# Patient Record
Sex: MALE | Race: WHITE | Employment: OTHER | URBAN - METROPOLITAN AREA
[De-identification: names, ages, dates, MRNs, and addresses within clinical notes are randomized per-mention and may not be internally consistent; named-entity substitution may affect disease eponyms.]

---

## 2017-07-28 PROBLEM — N40.0 BENIGN PROSTATIC HYPERPLASIA WITHOUT LOWER URINARY TRACT SYMPTOMS: Status: ACTIVE | Noted: 2017-07-28

## 2018-11-16 PROBLEM — Z79.899 ENCOUNTER FOR LONG-TERM CURRENT USE OF MEDICATION: Status: ACTIVE | Noted: 2018-11-16

## 2019-09-24 PROBLEM — C45.0 MESOTHELIOMA (PLEURAL) (HCC): Status: ACTIVE | Noted: 2019-09-24

## 2019-10-11 ENCOUNTER — HOSPITAL ENCOUNTER (OUTPATIENT)
Dept: CV DIAGNOSTICS | Facility: HOSPITAL | Age: 74
Discharge: HOME | End: 2019-10-11
Attending: SURGERY
Payer: MEDICARE

## 2019-10-11 DIAGNOSIS — R06.02 BREATH SHORTNESS: ICD-10-CM

## 2019-10-11 PROCEDURE — 93306 TTE W/DOPPLER COMPLETE: CPT | Performed by: SURGERY

## 2020-02-20 PROBLEM — G47.00 INSOMNIA, UNSPECIFIED TYPE: Status: ACTIVE | Noted: 2020-02-20

## 2020-06-22 PROBLEM — Z79.899 ENCOUNTER FOR LONG-TERM CURRENT USE OF MEDICATION: Status: RESOLVED | Noted: 2018-11-16 | Resolved: 2020-06-22

## 2020-06-22 PROBLEM — G89.29 OTHER CHRONIC PAIN: Status: ACTIVE | Noted: 2020-06-22

## 2020-10-01 PROBLEM — G47.00 INSOMNIA, UNSPECIFIED TYPE: Status: RESOLVED | Noted: 2020-02-20 | Resolved: 2020-10-01

## 2020-10-01 PROBLEM — F41.9 ANXIETY: Status: ACTIVE | Noted: 2020-10-01

## 2021-04-23 PROBLEM — R60.0 LOWER LEG EDEMA: Status: ACTIVE | Noted: 2021-04-23

## 2021-10-11 ENCOUNTER — HOSPITAL ENCOUNTER (INPATIENT)
Facility: HOSPITAL | Age: 76
LOS: 5 days | Discharge: HOME | DRG: 864 | End: 2021-10-16
Attending: EMERGENCY MEDICINE | Admitting: INTERNAL MEDICINE
Payer: MEDICARE

## 2021-10-11 ENCOUNTER — APPOINTMENT (OUTPATIENT)
Dept: ULTRASOUND IMAGING | Facility: HOSPITAL | Age: 76
DRG: 864 | End: 2021-10-11
Attending: EMERGENCY MEDICINE
Payer: MEDICARE

## 2021-10-11 ENCOUNTER — APPOINTMENT (OUTPATIENT)
Dept: CT IMAGING | Facility: HOSPITAL | Age: 76
DRG: 864 | End: 2021-10-11
Attending: EMERGENCY MEDICINE
Payer: MEDICARE

## 2021-10-11 ENCOUNTER — APPOINTMENT (OUTPATIENT)
Dept: GENERAL RADIOLOGY | Facility: HOSPITAL | Age: 76
DRG: 864 | End: 2021-10-11
Attending: EMERGENCY MEDICINE
Payer: MEDICARE

## 2021-10-11 DIAGNOSIS — R09.02 HYPOXIA: ICD-10-CM
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
## 2021-10-11 DIAGNOSIS — J94.8 HYDROPNEUMOTHORAX: ICD-10-CM

## 2021-10-11 DIAGNOSIS — R91.8 LUNG MASS: ICD-10-CM

## 2021-10-11 DIAGNOSIS — R50.9 FEVER, UNSPECIFIED FEVER CAUSE: Primary | ICD-10-CM

## 2021-10-11 DIAGNOSIS — J18.9 NOSOCOMIAL PNEUMONIA: ICD-10-CM

## 2021-10-11 PROBLEM — R79.89 AZOTEMIA: Status: ACTIVE | Noted: 2021-10-11

## 2021-10-11 PROBLEM — E87.1 HYPONATREMIA: Status: ACTIVE | Noted: 2021-10-11

## 2021-10-11 PROBLEM — D69.6 THROMBOCYTOPENIA (HCC): Status: ACTIVE | Noted: 2021-10-11

## 2021-10-11 PROBLEM — R73.9 HYPERGLYCEMIA: Status: ACTIVE | Noted: 2021-10-11

## 2021-10-11 PROCEDURE — 96366 THER/PROPH/DIAG IV INF ADDON: CPT

## 2021-10-11 PROCEDURE — 71045 X-RAY EXAM CHEST 1 VIEW: CPT | Performed by: EMERGENCY MEDICINE

## 2021-10-11 PROCEDURE — 84295 ASSAY OF SERUM SODIUM: CPT | Performed by: INTERNAL MEDICINE

## 2021-10-11 PROCEDURE — 93005 ELECTROCARDIOGRAM TRACING: CPT

## 2021-10-11 PROCEDURE — 94660 CPAP INITIATION&MGMT: CPT

## 2021-10-11 PROCEDURE — 85025 COMPLETE CBC W/AUTO DIFF WBC: CPT | Performed by: EMERGENCY MEDICINE

## 2021-10-11 PROCEDURE — 36415 COLL VENOUS BLD VENIPUNCTURE: CPT

## 2021-10-11 PROCEDURE — 87040 BLOOD CULTURE FOR BACTERIA: CPT | Performed by: EMERGENCY MEDICINE

## 2021-10-11 PROCEDURE — 93971 EXTREMITY STUDY: CPT | Performed by: EMERGENCY MEDICINE

## 2021-10-11 PROCEDURE — 99285 EMERGENCY DEPT VISIT HI MDM: CPT

## 2021-10-11 PROCEDURE — 96361 HYDRATE IV INFUSION ADD-ON: CPT

## 2021-10-11 PROCEDURE — 80053 COMPREHEN METABOLIC PANEL: CPT | Performed by: EMERGENCY MEDICINE

## 2021-10-11 PROCEDURE — 81001 URINALYSIS AUTO W/SCOPE: CPT | Performed by: EMERGENCY MEDICINE

## 2021-10-11 PROCEDURE — 83605 ASSAY OF LACTIC ACID: CPT | Performed by: EMERGENCY MEDICINE

## 2021-10-11 PROCEDURE — 71275 CT ANGIOGRAPHY CHEST: CPT | Performed by: EMERGENCY MEDICINE

## 2021-10-11 PROCEDURE — 5A09357 ASSISTANCE WITH RESPIRATORY VENTILATION, LESS THAN 24 CONSECUTIVE HOURS, CONTINUOUS POSITIVE AIRWAY PRESSURE: ICD-10-PCS | Performed by: INTERNAL MEDICINE

## 2021-10-11 PROCEDURE — 96365 THER/PROPH/DIAG IV INF INIT: CPT

## 2021-10-11 PROCEDURE — 84145 PROCALCITONIN (PCT): CPT | Performed by: INTERNAL MEDICINE

## 2021-10-11 PROCEDURE — 96367 TX/PROPH/DG ADDL SEQ IV INF: CPT

## 2021-10-11 PROCEDURE — 96375 TX/PRO/DX INJ NEW DRUG ADDON: CPT

## 2021-10-11 RX ORDER — ALBUTEROL SULFATE 90 UG/1
2 AEROSOL, METERED RESPIRATORY (INHALATION) EVERY 4 HOURS PRN
Status: DISCONTINUED | OUTPATIENT
Start: 2021-10-11 | End: 2021-10-16

## 2021-10-11 RX ORDER — MORPHINE SULFATE 15 MG/1
30 TABLET, FILM COATED, EXTENDED RELEASE ORAL EVERY MORNING
COMMUNITY
End: 2021-10-16

## 2021-10-11 RX ORDER — MORPHINE SULFATE 15 MG/1
15 TABLET, FILM COATED, EXTENDED RELEASE ORAL EVERY 8 HOURS SCHEDULED
COMMUNITY
End: 2021-10-16

## 2021-10-11 RX ORDER — LATANOPROST 50 UG/ML
1 SOLUTION/ DROPS OPHTHALMIC NIGHTLY
Status: DISCONTINUED | OUTPATIENT
Start: 2021-10-11 | End: 2021-10-16

## 2021-10-11 RX ORDER — LORAZEPAM 1 MG/1
2 TABLET ORAL NIGHTLY PRN
Status: DISCONTINUED | OUTPATIENT
Start: 2021-10-11 | End: 2021-10-16

## 2021-10-11 RX ORDER — SODIUM CHLORIDE 9 MG/ML
INJECTION, SOLUTION INTRAVENOUS CONTINUOUS
Status: DISCONTINUED | OUTPATIENT
Start: 2021-10-11 | End: 2021-10-12

## 2021-10-11 RX ORDER — TAMSULOSIN HYDROCHLORIDE 0.4 MG/1
0.4 CAPSULE ORAL
COMMUNITY

## 2021-10-11 RX ORDER — PREDNISONE 10 MG/1
20 TABLET ORAL 2 TIMES DAILY
COMMUNITY

## 2021-10-11 RX ORDER — DORZOLAMIDE HCL 20 MG/ML
1 SOLUTION/ DROPS OPHTHALMIC 2 TIMES DAILY
Status: DISCONTINUED | OUTPATIENT
Start: 2021-10-12 | End: 2021-10-16

## 2021-10-11 RX ORDER — FLUTICASONE FUROATE AND VILANTEROL TRIFENATATE 100; 25 UG/1; UG/1
1 POWDER RESPIRATORY (INHALATION) DAILY
COMMUNITY

## 2021-10-11 RX ORDER — ENOXAPARIN SODIUM 100 MG/ML
40 INJECTION SUBCUTANEOUS DAILY
Status: DISCONTINUED | OUTPATIENT
Start: 2021-10-11 | End: 2021-10-16

## 2021-10-11 RX ORDER — ATORVASTATIN CALCIUM 40 MG/1
40 TABLET, FILM COATED ORAL NIGHTLY
Status: DISCONTINUED | OUTPATIENT
Start: 2021-10-11 | End: 2021-10-16

## 2021-10-11 RX ORDER — MORPHINE SULFATE 2 MG/ML
INJECTION, SOLUTION INTRAMUSCULAR; INTRAVENOUS
Status: COMPLETED
Start: 2021-10-11 | End: 2021-10-11

## 2021-10-11 RX ORDER — ACETAMINOPHEN 500 MG
1000 TABLET ORAL ONCE
Status: COMPLETED | OUTPATIENT
Start: 2021-10-11 | End: 2021-10-11

## 2021-10-11 RX ORDER — MORPHINE SULFATE 30 MG/1
30 TABLET, FILM COATED, EXTENDED RELEASE ORAL 3 TIMES DAILY
Status: DISCONTINUED | OUTPATIENT
Start: 2021-10-11 | End: 2021-10-16

## 2021-10-11 RX ORDER — ONDANSETRON 2 MG/ML
4 INJECTION INTRAMUSCULAR; INTRAVENOUS EVERY 6 HOURS PRN
Status: DISCONTINUED | OUTPATIENT
Start: 2021-10-11 | End: 2021-10-16

## 2021-10-11 RX ORDER — HYDROCODONE BITARTRATE AND ACETAMINOPHEN 10; 325 MG/1; MG/1
1 TABLET ORAL EVERY 4 HOURS PRN
Status: DISCONTINUED | OUTPATIENT
Start: 2021-10-11 | End: 2021-10-16

## 2021-10-11 RX ORDER — FUROSEMIDE 20 MG/1
20 TABLET ORAL DAILY
COMMUNITY
End: 2021-10-16

## 2021-10-11 RX ORDER — MORPHINE SULFATE 2 MG/ML
2 INJECTION, SOLUTION INTRAMUSCULAR; INTRAVENOUS ONCE
Status: DISCONTINUED | OUTPATIENT
Start: 2021-10-11 | End: 2021-10-16

## 2021-10-11 RX ORDER — ONDANSETRON 2 MG/ML
4 INJECTION INTRAMUSCULAR; INTRAVENOUS ONCE
Status: COMPLETED | OUTPATIENT
Start: 2021-10-11 | End: 2021-10-11

## 2021-10-11 RX ORDER — ONDANSETRON 2 MG/ML
4 INJECTION INTRAMUSCULAR; INTRAVENOUS EVERY 4 HOURS PRN
Status: DISCONTINUED | OUTPATIENT
Start: 2021-10-11 | End: 2021-10-11

## 2021-10-11 RX ORDER — MORPHINE SULFATE 2 MG/ML
2 INJECTION, SOLUTION INTRAMUSCULAR; INTRAVENOUS ONCE
Status: COMPLETED | OUTPATIENT
Start: 2021-10-11 | End: 2021-10-11

## 2021-10-11 RX ORDER — MORPHINE SULFATE 30 MG/1
1 TABLET, FILM COATED, EXTENDED RELEASE ORAL EVERY 8 HOURS
COMMUNITY
Start: 2021-10-10 | End: 2021-10-11

## 2021-10-11 RX ORDER — METOCLOPRAMIDE HYDROCHLORIDE 5 MG/ML
10 INJECTION INTRAMUSCULAR; INTRAVENOUS EVERY 8 HOURS PRN
Status: DISCONTINUED | OUTPATIENT
Start: 2021-10-11 | End: 2021-10-16

## 2021-10-11 RX ORDER — MORPHINE SULFATE 15 MG/1
30 TABLET, FILM COATED, EXTENDED RELEASE ORAL NIGHTLY
COMMUNITY
End: 2021-10-16

## 2021-10-11 RX ORDER — DORZOLAMIDE HCL 20 MG/ML
1 SOLUTION/ DROPS OPHTHALMIC 2 TIMES DAILY
COMMUNITY

## 2021-10-11 RX ORDER — DIPHENHYDRAMINE HYDROCHLORIDE 50 MG/ML
25 INJECTION INTRAMUSCULAR; INTRAVENOUS ONCE
Status: COMPLETED | OUTPATIENT
Start: 2021-10-11 | End: 2021-10-11

## 2021-10-11 RX ORDER — VANCOMYCIN/0.9 % SOD CHLORIDE 1.75 G/5
25 PLASTIC BAG, INJECTION (ML) INTRAVENOUS ONCE
Status: COMPLETED | OUTPATIENT
Start: 2021-10-11 | End: 2021-10-11

## 2021-10-11 RX ORDER — TAMSULOSIN HYDROCHLORIDE 0.4 MG/1
0.4 CAPSULE ORAL
Status: DISCONTINUED | OUTPATIENT
Start: 2021-10-11 | End: 2021-10-16

## 2021-10-11 RX ORDER — MELATONIN
3 NIGHTLY
Status: DISCONTINUED | OUTPATIENT
Start: 2021-10-11 | End: 2021-10-16

## 2021-10-11 RX ORDER — SODIUM CHLORIDE 9 MG/ML
INJECTION, SOLUTION INTRAVENOUS CONTINUOUS
Status: ACTIVE | OUTPATIENT
Start: 2021-10-11 | End: 2021-10-12

## 2021-10-11 NOTE — H&P
[facility] Hospitalist H&P       CC: Patient presents with:  Fever       PCP: Cherie Gaming MD    History of Present Illness:  Ms. Charmian Hodgkin is a 63 yo male with PMH of mesothelioma s/p right thoracotomy for pleurectomy (10/16/19) and currently on immunotherapy, mg total) by mouth daily. , Disp: 90 tablet, Rfl: 3  LORazepam 1 MG Oral Tab, Take 1 mg by mouth., Disp: , Rfl:   HYDROcodone-acetaminophen  MG Oral Tab, Take 1 tablet by mouth nightly as needed. , Disp: , Rfl:   Albuterol Sulfate  (90 Base) MCG rashes/lesions  Psych: normal mood/affect        Diagnostic Data:    CBC/Chem  Recent Labs   Lab 10/05/21  1036 10/11/21  1243 10/11/21  1347   WBC 9.77 5.43 5.8   HGB 13.6 13.7 14.3   MCV 93.2 88.0 87.3    132* 128.0*       Recent Labs   Lab 10/05/ right lower lung could represent a combination of effusion and atelectasis or pneumonia although some of this is probably postoperative. 3. Blunting left costophrenic angle consistent with small left pleural effusion.     Dictated by (CST): Isabella Castellano MD

## 2021-10-11 NOTE — ED PROVIDER NOTES
Patient Seen in: [facility] Emergency Department      History   Patient presents with:  Fever    Stated Complaint: Pt from [facility], diarrhea x4 with 1 epidose of vomitin today, +fever,     Subjective:   HPI    This is 66-year-old male who has mesothelio 2 Standard drinks or equivalent per week      Comment: rare    Drug use: No             Review of Systems    Positive for stated complaint: Pt from [facility], diarrhea x4 with 1 epidose of vomitin today, +fever,   Other systems are as noted in HPI.   Constitution other components within normal limits   COMP METABOLIC PANEL (14) - Abnormal; Notable for the following components:    Glucose 112 (*)     Sodium 127 (*)     Chloride 94 (*)     Calculated Osmolality 266 (*)     ALT 14 (*)     Total Protein 6.0 (*)     Alb partial resection right 7th and 8th ribs. Correlation with known surgical history is necessary. There is blunting left costophrenic angle which is most likely a small left effusion as well. Heart size is within normal limits.   Mediastinum and joe are u right thyroid nodule. Further evaluation with dedicated thyroid ultrasound is recommended per ACR guidelines. 9. Stable mild aneurysmal dilatation of the ascending thoracic aorta, measuring up to 4.2 cm. 10. Severe coronary artery calcifications.    11. of breath and fevers. CONTRAST USED:  100cc of Omnipaque 350  FINDINGS:  VASCULATURE:  There is no evidence for pulmonary embolism.   There is mild right-sided pulmonary arterial narrowing involving right upper lobe, right middle lobe and right lower lobe image 241 of series 5. There is a band of airspace disease seen along the posterior medial aspect of the left lower lobe which may reflect atelectasis or pneumonia. This is adjacent to a left pleural effusion.   MEDIASTINUM:  The esophagus appears mildly d embolism. 2. There is a loculated right hydropneumothorax which is predominantly gas-filled. 3. Irregular pleural thickening throughout the right lung extending into the mediastinum and hilum mildly narrowing the central pulmonary arteries and veins.   Ther 10/11/2021  PROCEDURE:  XR CHEST AP PORTABLE  (CPT=71045)  TECHNIQUE:  AP chest radiograph was obtained. COMPARISON:  None.   INDICATIONS:  Pt from [facility], diarrhea x4 with 1 epidose of vomitin today, +fever,  PATIENT STATED HISTORY: (As transcribed by Steffi Hagan and felt because of his because the weight is located and the fact that he would benefit from actually a CT-guided IR procedure. I did talk to the [facility] hospitalist they will order in the morning.  I did go back and see the patient after the patient had gotte

## 2021-10-12 PROCEDURE — 80053 COMPREHEN METABOLIC PANEL: CPT | Performed by: INTERNAL MEDICINE

## 2021-10-12 PROCEDURE — 94640 AIRWAY INHALATION TREATMENT: CPT

## 2021-10-12 PROCEDURE — 85025 COMPLETE CBC W/AUTO DIFF WBC: CPT | Performed by: INTERNAL MEDICINE

## 2021-10-12 PROCEDURE — 85610 PROTHROMBIN TIME: CPT | Performed by: INTERNAL MEDICINE

## 2021-10-12 NOTE — CONSULTS
[facility]  Report of Consultation    Stephanie Savage Patient Status:  Inpatient    10/11/1945 MRN [id number]   [facility] 4NW-A Attending Xena Davis MD   Hosp Day # 1 PCP Tobi Turcios MD     ADMIT DATE AND TIME: 10/11/202 Brother        SOCIAL HX   reports that he quit smoking about 55 years ago. His smoking use included cigarettes. He has a 3.00 pack-year smoking history.  He has never used smokeless tobacco. He reports current alcohol use of about 1.0 - 2.0 standard drink negatives per the HPI. Physical Exam:   Blood pressure 108/64, pulse 91, temperature 98.1 °F (36.7 °C), temperature source Oral, resp. rate 16, height 1.702 m (5' 7\"), weight 70.8 kg (156 lb), SpO2 95 %.     Performance Status: 2   General: Patient i Collection Time: 10/11/21 12:43 PM   Result Value Ref Range    Adjusted WBC 5.43 4.00 - 13.00 10^3/uL    Neutrophils Absolute Manual 4.34 1.30 - 6.70 10ˆ3/µL    Lymphocytes Absolute Manual 0.33 (L) 0.90 - 4.00 10ˆ3/µL    Monocytes Absolute Manual 0.76 0.10 Result Value Ref Range    Procalcitonin 0.14 <=0.16 ng/mL   RAINBOW DRAW LAVENDER    Collection Time: 10/11/21  1:47 PM   Result Value Ref Range    Hold Lavender Auto Resulted    CBC W/ DIFFERENTIAL    Collection Time: 10/11/21  1:47 PM   Result Value Re 3-5 (A) 0 - 2 /HPF    Bacteria Urine None Seen None Seen /HPF    Squamous Epi.  Cells Few (A) None Seen /HPF    Renal Tubular Epithelial Cells None Seen None Seen /HPF    Transitional Cells None Seen None Seen /HPF    Ca Oxalate Crystals Few (A) None Seen / 10/11/2021  CONCLUSION:  Unremarkable left upper extremity Doppler ultrasound examination. There is no evidence of DVT.     Dictated by (CST): Rita Barton MD on 10/11/2021 at 6:33 PM     Finalized by (CST): Rita Barton MD on 10/11/2021 at 6:34 PM immunotherapy and can have systemic inflammation with fever. Cultures drawn and on empiric antibiotics. May need Thoracentesis and chest tube     Reviewed clinical condition with him  No active cancer therapy while here.  He understand that if there is

## 2021-10-12 NOTE — CONSULTS
Pulmonary H&P/Consult       NAME: Nikolai Bassem Dr: [id number] - MRN: [id number] - Age: 64year old - :  10/11/1945    Date of Admission: 10/11/2021  1:26 PM  Admission Diagnosis: Lung mass [R91.8]  Hypoxia [R09.02]  Nosocomial pneumonia [J18.9, Y9 mouth After dinner. Evening, Disp: , Rfl: , 10/10/2021 at 2200  Dorzolamide HCl 2 % Ophthalmic Solution, Place 1 drop into both eyes 2 (two) times a day., Disp: , Rfl: , 10/11/2021 at 2100  furosemide 20 MG Oral Tab, Take 20 mg by mouth daily. , Disp: , Rfl education: Not on file      Highest education level: Not on file    Occupational History      Not on file    Tobacco Use      Smoking status: Former Smoker        Packs/day: 1.00        Years: 3.00        Pack years: 3        Types: Cigarettes        Quit on file      Marital Status: Not on file  Intimate Partner Violence:       Fear of Current or Ex-Partner: Not on file      Emotionally Abused: Not on file      Physically Abused: Not on file      Sexually Abused: Not on file  Housing Stability:       Unabl Disp: , Rfl:   latanoprost (XALATAN) 0.005 % Ophthalmic Solution, Place 1 drop into both eyes nightly., Disp: , Rfl:         Scheduled Medication:  • magnesium sulfate  4 g Intravenous Once   • morphINE sulfate  2 mg Intravenous Once   • atorvastatin  40 m Eyes:    PERRL, conjunctiva/corneas clear, EOM's intact, both eyes   Throat:   Lips, mucosa, and tongue normal; teeth and gums normal   Neck:   Supple, symmetrical, trachea midline, no adenopathy;        thyroid:  No enlargement/tenderness/nodules; no ca

## 2021-10-12 NOTE — CONSULTS
INFECTIOUS DISEASE [facility] Patient Status:  Inpatient    10/11/1945 MRN [id number]   [facility] 4NW-A Attending Guille Lundberg MD   [facility] Day # 1 PCP Ning Perdomo history. He has never used smokeless tobacco. He reports current alcohol use of about 1.0 - 2.0 standard drink of alcohol per week. He reports that he does not use drugs.       Allergies:    Sulfa Antibiotics       HIVES  Adhesive Tape             Merthiola Dorzolamide HCl 2 % Ophthalmic Solution, Place 1 drop into both eyes 2 (two) times a day., Disp: , Rfl:   furosemide 20 MG Oral Tab, Take 20 mg by mouth daily. , Disp: , Rfl:   morphINE Sulfate ER 15 MG Oral Tab CR, Take 30 mg by mouth every morning., Dis nondistended. Musculoskeletal: Full range of motion of all extremities. No swelling noted. Joints: no effusions  Skin: No lesions.  No erythema, no open wounds      Laboratory Data:  Laboratory data reviewed      Recent Labs   Lab 10/12/21  0643   RBC mass     Hypoxia      ASSESSMENT/PLAN:  1.  Mesothelioma  On chemo at [facility]  -admitted 10/11 with reports of diarrhea at home and low grade temps  Has not had diarrhea since admission    CT shows loculated hydropneumothorax, pulmonary following and scheduled

## 2021-10-12 NOTE — DIETARY NOTE
[facility]  NUTRITION ASSESSMENT    Pt does not meet malnutrition criteria. NUTRITION INTERVENTION:  1. Meal and Snacks - Monitor patient po intake. Encourage adequate po of appropriate diet. 2. Medical Food Supplements - RD added Ensure HP BID.  R SYSTEM REVIEW: emesis and nausea PTA    NUTRITION RELATED PHYSICAL FINDINGS:     1. Body Fat/Muscle Mass: well nourished      2.  Fluid Accumulation: upper extremity edema and lower extremity edema     NUTRITION PRESCRIPTION: 70.8 kg  Calories: [id number] ca

## 2021-10-12 NOTE — PROGRESS NOTES
Pt AxOx4. Scheduled medications given. Wife help[ed answer navigator questions at bedside. PRN ativan given. Resting comfortably. CPAP in place.

## 2021-10-13 PROCEDURE — 87493 C DIFF AMPLIFIED PROBE: CPT | Performed by: HOSPITALIST

## 2021-10-13 PROCEDURE — 83735 ASSAY OF MAGNESIUM: CPT | Performed by: INTERNAL MEDICINE

## 2021-10-13 RX ORDER — LOPERAMIDE HYDROCHLORIDE 2 MG/1
2 CAPSULE ORAL 4 TIMES DAILY PRN
Status: DISCONTINUED | OUTPATIENT
Start: 2021-10-13 | End: 2021-10-16

## 2021-10-13 NOTE — PLAN OF CARE
Problem: CARDIOVASCULAR - ADULT  Goal: Maintains optimal cardiac output and hemodynamic stability  Description: INTERVENTIONS:  - Monitor vital signs, rhythm, and trends  -  Problem: GASTROINTESTINAL - ADULT  Goal: Maintains adequate nutritional intake (

## 2021-10-13 NOTE — PLAN OF CARE
Pt alert x4 c/o L arm swelling dopplers negative for dvt. Pt arm elevated on pillow. Pt has chronic pain on ms contin. Pt has Port a cath to L chest ER attempted to access port without success.  Port re accessed this am with 1in power needle unable to flush

## 2021-10-13 NOTE — PROGRESS NOTES
[facility]  Progress Note    Andres Butler Patient Status:  Inpatient    10/11/1945 MRN [id number]   [facility] 4NW-A Attending Raul Zuñiga MD   Hosp Day # 2 PCP Clifford Colorado MD     ADMISSION DATE AND TIME: 10/11/2021  1: 3 mg Oral Nightly   • morphINE Sulfate ER  30 mg Oral TID   • predniSONE  30 mg Oral Daily   • tamsulosin  0.4 mg Oral After dinner   • enoxaparin  40 mg Subcutaneous Daily         ASSESSMENT AND PLAN:     Jinny Hartley is a 64year old male with metast

## 2021-10-13 NOTE — PROGRESS NOTES
[facility] Hospitalist Progress Note     SUBJECTIVE:  3 watery BMs today. Pt concerned this will continue. Minimal PO intake.      OBJECTIVE:  Temp:  [97.8 °F (36.6 °C)-98.3 °F (36.8 °C)] 98.2 °F (36.8 °C)  Pulse:  [82-98] 91  Resp:  [16-18] 18  BP: (106-127)/(5 puff, 1 puff, Inhalation, Daily  HYDROcodone-acetaminophen (NORCO)  MG per tab 1 tablet, 1 tablet, Oral, Q4H PRN  latanoprost (XALATAN) 0.005 % ophthalmic solution 1 drop, 1 drop, Both Eyes, Nightly  LORazepam (ATIVAN) tab 2 mg, 2 mg, Oral, Nightly P Emily Castaneda [facility]  Internal Medicine, Hospitalist  Answering service: [phone]

## 2021-10-13 NOTE — PLAN OF CARE
Patient A+Ox4, forgetful at times. C/o mild pain but declined any pain medication besides scheduled morphine. Fall precautions maintained. Had a few episodes of diarrhea. Sent specimen to lab for cdiff, negation. Isolation discontinued.  Updated MD on diarr assistive devices as appropriate  - Consider OT/PT consult to assist with strengthening/mobility  - Encourage toileting schedule  Outcome: Progressing     Problem: GASTROINTESTINAL - ADULT  Goal: Maintains or returns to baseline bowel function  Description

## 2021-10-13 NOTE — PROGRESS NOTES
[facility]                INFECTIOUS DISEASE PROGRESS NOTE    Tanya Grace Patient Status:  Inpatient    10/11/1945 MRN [id number]   [facility] 4NW-A Attending Marthenia Goodell, MD   Hosp Day # 2 PCP Swati Tiwari MD     A --  20   ALT 11 14*  --  20   BILT 0.70 0.8  --  0.8   TP 5.8* 6.0*  --  5.1*       No results found for: [facility] Encounter on 10/11/21   1.  Blood Culture     Status: None (Preliminary result)    Collection Time: 10/11/21  3:05 PM

## 2021-10-14 ENCOUNTER — APPOINTMENT (OUTPATIENT)
Dept: CT IMAGING | Facility: HOSPITAL | Age: 76
DRG: 864 | End: 2021-10-14
Attending: HOSPITALIST
Payer: MEDICARE

## 2021-10-14 PROCEDURE — 84132 ASSAY OF SERUM POTASSIUM: CPT | Performed by: HOSPITALIST

## 2021-10-14 PROCEDURE — 80048 BASIC METABOLIC PNL TOTAL CA: CPT | Performed by: HOSPITALIST

## 2021-10-14 PROCEDURE — 74176 CT ABD & PELVIS W/O CONTRAST: CPT | Performed by: HOSPITALIST

## 2021-10-14 RX ORDER — POTASSIUM CHLORIDE 20 MEQ/1
40 TABLET, EXTENDED RELEASE ORAL EVERY 4 HOURS
Status: COMPLETED | OUTPATIENT
Start: 2021-10-14 | End: 2021-10-14

## 2021-10-14 NOTE — PLAN OF CARE
Patient alert and oriented x 4, lung sounds diminished on room air, SOB with exertion, CPOX, abdomen soft, bowel sounds present, passing flatus, voids adequate amounts, loose stools this evening with associated cramping, imodium admin as ordered, medicatio

## 2021-10-14 NOTE — PROGRESS NOTES
[facility] Hospitalist Progress Note     SUBJECTIVE:  Multiple watery BMs since yesterday. +nausea. Small emesis reported. Having some vague LLQ discomfort. Supportive family at bedside.      OBJECTIVE:  Temp:  [98.3 °F (36.8 °C)-98.6 °F (37 °C)] 98.3 °F (36 solution 1 drop, 1 drop, Both Eyes, BID  fluticasone furoate-vilanterol (BREO ELLIPTA) 100-25 MCG/INH inhaler 1 puff, 1 puff, Inhalation, Daily  HYDROcodone-acetaminophen (NORCO)  MG per tab 1 tablet, 1 tablet, Oral, Q4H PRN  latanoprost (XALATAN) 0. follows with oncology at [facility]  - onc c/s -- appreciate recs  - has been on immunotherapy     # Chronic pain  - takes PO morphine 30 mg TID  - norco prn  - continue     Prophy:  DVT: lovenox  Deconditioning prevention: PT/OT     Dispo: dc when diarrhea and

## 2021-10-14 NOTE — PROGRESS NOTES
[facility]                INFECTIOUS DISEASE PROGRESS NOTE    Thierno Bo Patient Status:  Inpatient    10/11/1945 MRN [id number]   [facility] 4NW-A Attending Talha Aceves MD   Hosp Day # 3 PCP Sebastien Cedillo MD 132* 134*   K 4.00 3.6  --   --  3.6 3.0*   CL 91* 94*  --   --  100 104   CO2 28.2 28.0  --   --  25.0 25.0   ALKPHO 83 65  --   --  64  --    AST 19 19  --   --  20  --    ALT 11 14*  --   --  20  --    BILT 0.70 0.8  --   --  0.8  --    TP 5.8* 6.0*  --

## 2021-10-15 ENCOUNTER — APPOINTMENT (OUTPATIENT)
Dept: ULTRASOUND IMAGING | Facility: HOSPITAL | Age: 76
DRG: 864 | End: 2021-10-15
Attending: HOSPITALIST
Payer: MEDICARE

## 2021-10-15 PROCEDURE — 81001 URINALYSIS AUTO W/SCOPE: CPT | Performed by: INTERNAL MEDICINE

## 2021-10-15 PROCEDURE — 76700 US EXAM ABDOM COMPLETE: CPT | Performed by: HOSPITALIST

## 2021-10-15 RX ORDER — HYDROCORTISONE 25 MG/G
CREAM TOPICAL 2 TIMES DAILY
Status: DISCONTINUED | OUTPATIENT
Start: 2021-10-15 | End: 2021-10-16

## 2021-10-15 NOTE — PROGRESS NOTES
[facility]                INFECTIOUS DISEASE PROGRESS NOTE    Erasto Harvey Patient Status:  Inpatient    10/11/1945 MRN [id number]   [facility] 4NW-A Attending Radha Bennett MD   Hosp Day # 4 PCP Anthony Booker MD 127*   < > 128* 132* 134*  --    K 4.00 3.6   < >  --  3.6 3.0* 3.9   CL 91* 94*  --   --  100 104  --    CO2 28.2 28.0  --   --  25.0 25.0  --    ALKPHO 83 65  --   --  64  --   --    AST 19 19  --   --  20  --   --    ALT 11 14*  --   --  20  --   --

## 2021-10-15 NOTE — CONSULTS
[facility]                                             Department of Urology  Consultation Note    Tanya Grace Patient Status:  Inpatient    10/11/1945 MRN [id number]   Location E Laterality Date   • CATARACT     • COLONOSCOPY     • COLONOSCOPY N/A 10/6/2017    Procedure: COLONOSCOPY, POSSIBLE BIOPSY, POSSIBLE POLYPECTOMY 19753;  Surgeon:  Jenelle Eugene MD;  Location: [address] [place] [address]   • OTHER SURGICAL HISTORY      trig (FLOMAX) cap 0.4 mg, 0.4 mg, Oral, After dinner  •  enoxaparin (LOVENOX) 40 MG/0.4ML injection 40 mg, 40 mg, Subcutaneous, Daily  •  ondansetron (ZOFRAN) injection 4 mg, 4 mg, Intravenous, Q6H PRN  •  metoclopramide (REGLAN) injection 10 mg, 10 mg, [place] is seen.  Punctate possible proteinaceous cyst at the mid medullary region of the right kidney.  This is incompletely   assessed without intravenous contrast.   AORTA/VASCULAR:  Scattered atherosclerosis. RETROPERITONEUM:  Unremarkable.    BOWEL/MESENTERY yesterday  Fevers prior to admission  Tmax 100.4 on 10/11/21, afebrile 10/12, 10/13,. 10/14, and so far today  UA does not appear infectiou on 10/11/21, repeat UA has been ordered  2/2/ blood cultures 10/11/21: prelim no growth after 3 days  Lactic acid 10

## 2021-10-15 NOTE — DIETARY NOTE
[facility]  NUTRITION ASSESSMENT    Pt does not meet malnutrition criteria. NUTRITION INTERVENTION:  1. Meal and Snacks - Monitor patient po intake. Encourage adequate po of appropriate diet. 2. Medical Food Supplements - RD added Ensure HP BID.  R (%)    10/12/21 2000 50 %    10/13/21 0800 0 %    10/13/21 1301 100 %    10/13/21 1813 100 %    10/14/21 0900 10 %      FOOD/NUTRITION RELATED HISTORY:   Appetite: Fair  Intake: See above  Intake Meeting Needs: Marginal, added supplements  Food Allergies:

## 2021-10-15 NOTE — PLAN OF CARE
Alert and oriented x4. Dyspnea on exertion. Room air with cpap at night. Tele NSR. Prn norco given for kacey flank pain. NPO for urology eval. Strain all urine, no visible stones noted. Had x4 diarrhea Prn imodium given. Encouraged PO intake.  US abdomen to

## 2021-10-15 NOTE — PROGRESS NOTES
[facility] Hospitalist Progress Note     SUBJECTIVE:  Still with diarrhea, decreasing in quantity. LLQ/flank pain remains.      OBJECTIVE:  Temp:  [97.4 °F (36.3 °C)-98.2 °F (36.8 °C)] 97.6 °F (36.4 °C)  Pulse:  [74-94] 94  Resp:  [18-20] 20  BP: (113-138)/(61-8 100-25 MCG/INH inhaler 1 puff, 1 puff, Inhalation, Daily  HYDROcodone-acetaminophen (NORCO)  MG per tab 1 tablet, 1 tablet, Oral, Q4H PRN  latanoprost (XALATAN) 0.005 % ophthalmic solution 1 drop, 1 drop, Both Eyes, Nightly  LORazepam (ATIVAN) tab 2 # gallstones and ?gallbladder distention  - incidentally noted on CT a/p  - f/u abd ultrasound: no gallbladder wall thickening, neg murphys, no biliary ductal dilation, no surrounding fluid.  No abd pain on exam.   - no further w/u indicated at this jf

## 2021-10-16 VITALS
BODY MASS INDEX: 25.08 KG/M2 | RESPIRATION RATE: 18 BRPM | TEMPERATURE: 98 F | SYSTOLIC BLOOD PRESSURE: 122 MMHG | OXYGEN SATURATION: 98 % | HEART RATE: 90 BPM | WEIGHT: 159.81 LBS | DIASTOLIC BLOOD PRESSURE: 76 MMHG | HEIGHT: 67 IN

## 2021-10-16 PROCEDURE — 85025 COMPLETE CBC W/AUTO DIFF WBC: CPT | Performed by: HOSPITALIST

## 2021-10-16 PROCEDURE — 80048 BASIC METABOLIC PNL TOTAL CA: CPT | Performed by: HOSPITALIST

## 2021-10-16 RX ORDER — HYDROCORTISONE 25 MG/G
1 CREAM TOPICAL 2 TIMES DAILY
Qty: 28 G | Refills: 0 | Status: SHIPPED | OUTPATIENT
Start: 2021-10-16

## 2021-10-16 RX ORDER — MORPHINE SULFATE 30 MG/1
30 TABLET, FILM COATED, EXTENDED RELEASE ORAL 3 TIMES DAILY
Qty: 1 TABLET | Refills: 0 | Status: SHIPPED | COMMUNITY
Start: 2021-10-16

## 2021-10-16 RX ORDER — POTASSIUM CHLORIDE 20 MEQ/1
40 TABLET, EXTENDED RELEASE ORAL ONCE
Status: COMPLETED | OUTPATIENT
Start: 2021-10-16 | End: 2021-10-16

## 2021-10-16 NOTE — PLAN OF CARE
Patient c/o mild pain on right chest. Respirations non-labored, lungs sound diminished, O2 sat 98% on room air. Patient lower back pain, denies pain with urination, urine strained, no stone noted. Patient had 1 loose stool this morning.  Patient's vital sig

## 2021-10-16 NOTE — PROGRESS NOTES
[facility]    Progress Note    Joanna Oseguera Patient Status:  Inpatient    10/11/1945 MRN [id number]   [facility] 4NW-A Attending Maninder Westbrook MD   [address] Day # 5 PCP Tina Christensen MD     Subjective:   Joanna Oseguera is a(n) 64 this ultrasound exam would be  consistent with that diagnosis.     Dictated by (CST): Ian Brice MD on 10/15/2021 at 12:54 PM     Finalized by (CST): Ian Brice MD on 10/15/2021 at 12:58 PM       CT ABDOMEN+PELVIS(CPT=74176)    Result Date: 10/14/

## 2021-10-16 NOTE — DISCHARGE SUMMARY
General Medicine Discharge Summary     Patient ID:  Evelia Hinojosa  64year old  10/11/1945    Admit date: 10/11/2021    Discharge date and time: 10/16/21     Attending Physician: Giana Serrato MD     Primary Care Physician: Divina Hooker MD     [facility] outpatient.  Diarrhea improving, imodium prn.     # L UVJ stone  - mild-mod hydronephrosis  - repeat UA negative  - cefazolin per ID  - apprec urology recs - monitor -- patient to monitor and pass at home -- f/u with urology as outpatient     # gallstones a Inhalation Aerosol Powder, Breath Activated  Inhale 1 puff into the lungs daily. Melatonin 3 MG Oral Cap  Take 3 mg by mouth at bedtime. atorvastatin 40 MG Oral Tab  Take 1 tablet (40 mg total) by mouth daily.     LORazepam 1 MG Oral Tab  Take 2 mg by

## 2021-10-16 NOTE — PROGRESS NOTES
[facility]                INFECTIOUS DISEASE PROGRESS NOTE    Jacques Davis Patient Status:  Inpatient    10/11/1945 MRN [id number]   [facility] 4NW-A Attending Zaid Leroy MD   Hosp Day # 5 PCP Andree Cano MD 8.2*  --  7.9*  --  8.3*   ALB  --  3.1* 2.4*  --  1.8*  --   --   --   --    NA  --  127* 127*   < > 132*  --  134*  --  133*   K   < > 4.00 3.6  --  3.6   < > 3.0* 3.9 3.4*   CL   < > 91* 94*  --  100  --  104  --  104   CO2   < > 28.2 28.0  --  25.0  -

## 2021-10-16 NOTE — PLAN OF CARE
Patient alert and oriented x 4, lungs diminished on room air, CPAP at HS,abdomen soft, bowel sounds present, denies nausea, BM this am, remains loose, voiding adequate amounts, asymptomatic, straining urine, scheduled narcotics for pain, continuous telemet

## 2021-10-16 NOTE — PLAN OF CARE
Pt alert/oriented x 4. Had 3-4 watery stools on day shift. Immodium given as ordered. Seen by urology, Bhupinder STEWARD today. Continue to strain urine. Voiding in adequate amounts. Urine culture sent, IV cefazolin given as ordered.   Abdominal ultrasound Follow urinary retention protocol/standard of care  - Consider collaborating with pharmacy to review patient's medication profile  - Implement strategies to promote bladder emptying  Outcome: Progressing     Problem: HEMATOLOGIC - ADULT  Goal: Maintains he toileting schedule  Outcome: Progressing     Problem: GASTROINTESTINAL - ADULT  Goal: Maintains or returns to baseline bowel function  Description: INTERVENTIONS:  - Assess bowel function  - Maintain adequate hydration with IV or PO as ordered and tolerate

## 2021-10-21 ENCOUNTER — HOSPITAL ENCOUNTER (INPATIENT)
Facility: HOSPITAL | Age: 76
LOS: 6 days | Discharge: HOME HEALTH | DRG: 270 | End: 2021-10-28
Attending: EMERGENCY MEDICINE | Admitting: HOSPITALIST
Payer: MEDICARE

## 2021-10-21 DIAGNOSIS — R06.00 DYSPNEA, UNSPECIFIED TYPE: Primary | ICD-10-CM

## 2021-10-21 DIAGNOSIS — M79.89 LEFT ARM SWELLING: ICD-10-CM

## 2021-10-21 PROCEDURE — 84484 ASSAY OF TROPONIN QUANT: CPT | Performed by: EMERGENCY MEDICINE

## 2021-10-21 PROCEDURE — 80053 COMPREHEN METABOLIC PANEL: CPT | Performed by: EMERGENCY MEDICINE

## 2021-10-21 PROCEDURE — 85027 COMPLETE CBC AUTOMATED: CPT

## 2021-10-21 PROCEDURE — 84484 ASSAY OF TROPONIN QUANT: CPT

## 2021-10-21 PROCEDURE — 85610 PROTHROMBIN TIME: CPT

## 2021-10-21 PROCEDURE — 85007 BL SMEAR W/DIFF WBC COUNT: CPT

## 2021-10-21 PROCEDURE — 85025 COMPLETE CBC W/AUTO DIFF WBC: CPT

## 2021-10-21 PROCEDURE — 85730 THROMBOPLASTIN TIME PARTIAL: CPT

## 2021-10-21 PROCEDURE — 85610 PROTHROMBIN TIME: CPT | Performed by: EMERGENCY MEDICINE

## 2021-10-21 PROCEDURE — 99285 EMERGENCY DEPT VISIT HI MDM: CPT

## 2021-10-21 PROCEDURE — 85730 THROMBOPLASTIN TIME PARTIAL: CPT | Performed by: EMERGENCY MEDICINE

## 2021-10-21 PROCEDURE — 93010 ELECTROCARDIOGRAM REPORT: CPT

## 2021-10-21 PROCEDURE — 85025 COMPLETE CBC W/AUTO DIFF WBC: CPT | Performed by: EMERGENCY MEDICINE

## 2021-10-21 PROCEDURE — 80053 COMPREHEN METABOLIC PANEL: CPT

## 2021-10-21 PROCEDURE — 83880 ASSAY OF NATRIURETIC PEPTIDE: CPT | Performed by: EMERGENCY MEDICINE

## 2021-10-22 ENCOUNTER — APPOINTMENT (OUTPATIENT)
Dept: CT IMAGING | Facility: HOSPITAL | Age: 76
DRG: 270 | End: 2021-10-22
Attending: EMERGENCY MEDICINE
Payer: MEDICARE

## 2021-10-22 ENCOUNTER — APPOINTMENT (OUTPATIENT)
Dept: CV DIAGNOSTICS | Facility: HOSPITAL | Age: 76
DRG: 270 | End: 2021-10-22
Attending: INTERNAL MEDICINE
Payer: MEDICARE

## 2021-10-22 ENCOUNTER — APPOINTMENT (OUTPATIENT)
Dept: ULTRASOUND IMAGING | Facility: HOSPITAL | Age: 76
DRG: 270 | End: 2021-10-22
Attending: EMERGENCY MEDICINE
Payer: MEDICARE

## 2021-10-22 ENCOUNTER — APPOINTMENT (OUTPATIENT)
Dept: GENERAL RADIOLOGY | Facility: HOSPITAL | Age: 76
DRG: 270 | End: 2021-10-22
Payer: MEDICARE

## 2021-10-22 PROBLEM — R06.00 DYSPNEA: Status: ACTIVE | Noted: 2021-10-22

## 2021-10-22 PROBLEM — R06.00 DYSPNEA, UNSPECIFIED TYPE: Status: ACTIVE | Noted: 2021-10-22

## 2021-10-22 PROBLEM — M79.89 LEFT ARM SWELLING: Status: ACTIVE | Noted: 2021-10-22

## 2021-10-22 PROCEDURE — 93005 ELECTROCARDIOGRAM TRACING: CPT

## 2021-10-22 PROCEDURE — 85730 THROMBOPLASTIN TIME PARTIAL: CPT | Performed by: HOSPITALIST

## 2021-10-22 PROCEDURE — 93971 EXTREMITY STUDY: CPT | Performed by: EMERGENCY MEDICINE

## 2021-10-22 PROCEDURE — 84484 ASSAY OF TROPONIN QUANT: CPT | Performed by: EMERGENCY MEDICINE

## 2021-10-22 PROCEDURE — 93306 TTE W/DOPPLER COMPLETE: CPT | Performed by: INTERNAL MEDICINE

## 2021-10-22 PROCEDURE — 71275 CT ANGIOGRAPHY CHEST: CPT | Performed by: EMERGENCY MEDICINE

## 2021-10-22 PROCEDURE — 87493 C DIFF AMPLIFIED PROBE: CPT | Performed by: HOSPITALIST

## 2021-10-22 PROCEDURE — 80048 BASIC METABOLIC PNL TOTAL CA: CPT | Performed by: HOSPITALIST

## 2021-10-22 PROCEDURE — 94660 CPAP INITIATION&MGMT: CPT

## 2021-10-22 PROCEDURE — 94640 AIRWAY INHALATION TREATMENT: CPT

## 2021-10-22 PROCEDURE — 96374 THER/PROPH/DIAG INJ IV PUSH: CPT

## 2021-10-22 PROCEDURE — 85027 COMPLETE CBC AUTOMATED: CPT | Performed by: HOSPITALIST

## 2021-10-22 PROCEDURE — 71046 X-RAY EXAM CHEST 2 VIEWS: CPT

## 2021-10-22 PROCEDURE — 93970 EXTREMITY STUDY: CPT | Performed by: EMERGENCY MEDICINE

## 2021-10-22 PROCEDURE — 81003 URINALYSIS AUTO W/O SCOPE: CPT | Performed by: EMERGENCY MEDICINE

## 2021-10-22 RX ORDER — MORPHINE SULFATE 15 MG/1
30 TABLET, FILM COATED, EXTENDED RELEASE ORAL 3 TIMES DAILY
Status: DISCONTINUED | OUTPATIENT
Start: 2021-10-22 | End: 2021-10-28

## 2021-10-22 RX ORDER — ACETAMINOPHEN 325 MG/1
650 TABLET ORAL EVERY 6 HOURS PRN
Status: DISCONTINUED | OUTPATIENT
Start: 2021-10-22 | End: 2021-10-28

## 2021-10-22 RX ORDER — HEPARIN SODIUM 5000 [USP'U]/ML
80 INJECTION INTRAVENOUS; SUBCUTANEOUS ONCE
Status: COMPLETED | OUTPATIENT
Start: 2021-10-22 | End: 2021-10-22

## 2021-10-22 RX ORDER — MELATONIN
3 NIGHTLY
Status: DISCONTINUED | OUTPATIENT
Start: 2021-10-22 | End: 2021-10-28

## 2021-10-22 RX ORDER — HEPARIN SODIUM AND DEXTROSE 10000; 5 [USP'U]/100ML; G/100ML
INJECTION INTRAVENOUS CONTINUOUS
Status: DISCONTINUED | OUTPATIENT
Start: 2021-10-22 | End: 2021-10-23

## 2021-10-22 RX ORDER — LORAZEPAM 1 MG/1
2 TABLET ORAL NIGHTLY
Status: DISCONTINUED | OUTPATIENT
Start: 2021-10-22 | End: 2021-10-28

## 2021-10-22 RX ORDER — HEPARIN SODIUM AND DEXTROSE 10000; 5 [USP'U]/100ML; G/100ML
18 INJECTION INTRAVENOUS ONCE
Status: COMPLETED | OUTPATIENT
Start: 2021-10-22 | End: 2021-10-22

## 2021-10-22 RX ORDER — LATANOPROST 50 UG/ML
1 SOLUTION/ DROPS OPHTHALMIC NIGHTLY
Status: DISCONTINUED | OUTPATIENT
Start: 2021-10-22 | End: 2021-10-28

## 2021-10-22 RX ORDER — TAMSULOSIN HYDROCHLORIDE 0.4 MG/1
0.4 CAPSULE ORAL
Status: DISCONTINUED | OUTPATIENT
Start: 2021-10-22 | End: 2021-10-28

## 2021-10-22 RX ORDER — ATORVASTATIN CALCIUM 40 MG/1
40 TABLET, FILM COATED ORAL NIGHTLY
Status: DISCONTINUED | OUTPATIENT
Start: 2021-10-22 | End: 2021-10-28

## 2021-10-22 RX ORDER — POTASSIUM CHLORIDE 20 MEQ/1
40 TABLET, EXTENDED RELEASE ORAL ONCE
Status: COMPLETED | OUTPATIENT
Start: 2021-10-22 | End: 2021-10-22

## 2021-10-22 RX ORDER — HYDROCODONE BITARTRATE AND ACETAMINOPHEN 10; 325 MG/1; MG/1
1 TABLET ORAL EVERY 4 HOURS PRN
Status: DISCONTINUED | OUTPATIENT
Start: 2021-10-22 | End: 2021-10-28

## 2021-10-22 RX ORDER — ALBUTEROL SULFATE 90 UG/1
2 AEROSOL, METERED RESPIRATORY (INHALATION) EVERY 4 HOURS PRN
Status: DISCONTINUED | OUTPATIENT
Start: 2021-10-22 | End: 2021-10-28

## 2021-10-22 NOTE — CONSULTS
Vascular Surgery Consultation    Raul Merlin Patient Status:  Observation    10/11/1945 MRN [id number]   [facility] 5NW-A Attending Janet Gay MD   Hosp Day # 0 PCP Margarita Garcia MD     Reason for Consultation:  Left upper extrem Problem Relation Age of Onset   • Heart Disorder Father         smoker/mi   • Other (Other) Mother         cva   • Cancer Sister         lumpectomy   • Hypertension Brother       reports that he quit smoking about 55 years ago.  His smoking use included c unusual skin lesions or rashes  MUSCULOSKELETAL: denies back pain, joint pain, leg swelling  NEURO/EYES: denies headaches, passing out, motor dysfunction, difficulty walking, difficulty with speech, temporary blindness, double vision, confusion    Physical and benefits were discussed. Patient has been added to the schedule and Dr. Richard White will perform in a.m. Recommend elevation of the arm with 2 pillows in the meantime to decrease swelling in anticipation of tomorrow's procedure.      Thank you for allowing

## 2021-10-22 NOTE — ED QUICK NOTES
Orders for admission, patient is aware of plan and ready to go upstairs. Any questions, please call ED RN naun  at extension [phone]. Vaccinated?   Type of COVID test sent:rapid negative  COVID Suspicion level: Low/High      Titratable drug(s) infusin

## 2021-10-22 NOTE — CONSULTS
Pulmonary H&P/Consult       NAME: Karey Griffith - ROOM: [address] [place] [address] - MRN: [id number] - Age: 64year old - :  10/11/1945    Date of Admission: 10/21/2021 11:17 PM  Admission Diagnosis: Left arm swelling [M79.89]  Dyspnea, unspecified type [R06.00]  Reas Unknown time  fluticasone furoate-vilanterol (BREO ELLIPTA) 100-25 MCG/INH Inhalation Aerosol Powder, Breath Activated, Inhale 1 puff into the lungs daily. , Disp: , Rfl: , 10/21/2021 at Unknown time  Melatonin 3 MG Oral Cap, Take 3 mg by mouth at bedtime. , Types: 1 - 2 Standard drinks or equivalent per week        Comment: rare      Drug use: No      Sexual activity: Yes    Other Topics      Concerns:        Not on file    Social History Narrative      marital status:       occupation: retired managed History   Problem Relation Age of Onset   • Heart Disorder Father         smoker/mi   • Other (Other) Mother         cva   • Cancer Sister         lumpectomy   • Hypertension Brother         Home Medications:  morphINE Sulfate ER 30 MG Oral Tab CR, Take 30 point ROS conducted, negative save for above       OBJECTIVE:   10/22/21  0400 10/22/21  0521 10/22/21  0558 10/22/21  0814   BP: 134/84 132/84 156/87 124/70   BP Location:   Right arm Right arm   Pulse: 79 84 86    Resp: 18 16     Temp:   99 °F (37.2 °C) O2 as tolerated  2. VTE  -RF of underlying malignancy and recent port placement  -heparin gtt started overnight  -BNP elevated- will check echo  -PVS following for upper extremity clot  -Heme to see, defer long term anticoagulation to them  3.  Hydropneumot

## 2021-10-22 NOTE — PLAN OF CARE
Pt A&Ox4.  on RA maintaining spO2 >90%, pt c/o GOLDSTEIN. NSR on tele. No c/o pain. Pt up with SBA to bathroom, voiding, pt with loose stools this AM, c-dif pending. Pt LUE swollen. BLE edema noted. Heparin gtt infusing per MAR.  Pt and family updated on plan

## 2021-10-22 NOTE — PROGRESS NOTES
NURSING ADMISSION NOTE      Patient admitted via Cart to room 532  Oriented to room. Safety precautions initiated. Bed in low position. Call light in reach. Admission raisa complete. Meds reviewed with pt.  Pt and wife at bedside updated on MD francis

## 2021-10-22 NOTE — CONSULTS
[facility]  Report of Consultation    Bal Mccoy Patient Status:  Observation    10/11/1945 MRN [id number]   [facility] 5NW-A Attending Augustina Infanet MD   Hosp Day # 0 PCP Cherie Gaming MD     ADMIT DATE AND TIME: 10/21/2021 11: details above.        On IV heparin           PERTINENT HISTORY:     History:  Past Medical History:   Diagnosis Date   • CANCER 2007    BCCA   • Glaucoma    • HYPERLIPIDEMIA    • Mesothelioma (pleural) ([facility] [address] - s/p bronch, R thoracotomy for pleurectomy on 1 PRN  •  latanoprost (XALATAN) 0.005 % ophthalmic solution 1 drop, 1 drop, Both Eyes, Nightly  •  melatonin tab 3 mg, 3 mg, Oral, Nightly  •  morphINE Sulfate ER (MS CONTIN) CR tab 30 mg, 30 mg, Oral, TID  •  predniSONE (DELTASONE) tab 30 mg, 30 mg, Oral, D Resulted    RAINBOW DRAW LAVENDER    Collection Time: 10/21/21 11:41 PM   Result Value Ref Range    Hold Lavender Auto Resulted    RAINBOW DRAW LIGHT GREEN    Collection Time: 10/21/21 11:41 PM   Result Value Ref Range    Hold Lt Green Auto Resulted    HARRISON Result Value Ref Range    Slide Review Slide reviewed, manual differential added    Manual differential    Collection Time: 10/21/21 11:41 PM   Result Value Ref Range    Neutrophil Absolute Manual 13.26 (H) 1.50 - 7.70 x10(3) uL    Lymphocyte Absolute Ma Negative Negative    Leukocyte Esterase Urine Negative Negative    Microscopic Microscopic not indicated    PTT, Activated    Collection Time: 10/22/21 11:16 AM   Result Value Ref Range    .1 (H) 25.4 - 36.1 seconds   CBC, Platelet;  No Differential right hemithorax is stable. CONCLUSION:  1. When compared to 10/11/2021 chest radiograph, an air-fluid level is now present within the right chest anteriorly. Please refer to CTA of the chest subsequently performed for further details.   These findings w internal jugular vein to the distal left brachial vein. 2. Additionally, there is superficial thrombus noted to extend from the proximal to distal left basilic vein.  3. The findings of this critical value study were discussed with the attending ER physicia Plan  Vascular surgery consult pending  Anticoagulation - heparin   We will follow while in-patient         Thank you for allowing me to participate in the care of your patient.     Yessi Ang MD

## 2021-10-22 NOTE — ED PROVIDER NOTES
Patient with a history of mesothelioma,  Patient Seen in: [facility] 5nw-a      History   Patient presents with:  Difficulty Breathing  Swelling Edema    Stated Complaint: sob and swelling  94% RA    Subjective:   HPI    Patient with a history of meso Types: 1 - 2 Standard drinks or equivalent per week      Comment: rare    Drug use: No             Review of Systems    Positive for stated complaint: sob and swelling  94% RA  Other systems are as noted in HPI. Constitutional and vital signs reviewed. - Abnormal; Notable for the following components:    Clarity Urine Hazy (*)     All other components within normal limits   PRO BETA NATRIURETIC PEPTIDE - Abnormal; Notable for the following components:    Pro-Beta Natriuretic Peptide 822 (*)     All other was reviewed. Ultrasound venous Doppler lower extremity bilateral no evidence for DVT    Ultrasound venous Doppler left upper extremity shows extensive DVT; CONCLUSION:     1.  There is left upper extremity DVT extending diffusely throughout the left up 10/22/2021 Unknown    Dyspnea R06.00 10/22/2021 Unknown    Left arm swelling M79.89 10/22/2021 Unknown

## 2021-10-22 NOTE — H&P
[facility] Hospitalist History and Physical      CC: Left arm swelling, SOB    PCP: Ino Mascorro MD    History of Present Illness: Patient is a 64year old male with PMH sig for mesothelioma currently on immunotherapy, oncologist is at [facility] here with weakness, S daily., Disp: , Rfl:   Melatonin 3 MG Oral Cap, Take 3 mg by mouth at bedtime. , Disp: , Rfl:   atorvastatin 40 MG Oral Tab, Take 1 tablet (40 mg total) by mouth daily. , Disp: 90 tablet, Rfl: 3  LORazepam 1 MG Oral Tab, Take 2 mg by mouth nightly as needed Location: Right arm)   Pulse 86   Temp 97.9 °F (36.6 °C) (Oral)   Resp 16   Wt 158 lb 11.7 oz (72 kg)   SpO2 95%   BMI 24.86 kg/m²     Gen: No acute distress  Eyes: Pink conjunctivae, No ptosis  Neck: No masses, trachae midline  Pulm: Lungs clear bilateral doppler:  Impression   CONCLUSION:  Negative exam, no evidence of lower extremity DVT.            CTA Chets:  CONCLUSION:     1.  There has been interval development of small pulmonary emboli to the left upper lobe and lower lobe, with the overall degree/

## 2021-10-23 ENCOUNTER — APPOINTMENT (OUTPATIENT)
Dept: INTERVENTIONAL RADIOLOGY/VASCULAR | Facility: HOSPITAL | Age: 76
DRG: 270 | End: 2021-10-23
Attending: SURGERY
Payer: MEDICARE

## 2021-10-23 PROCEDURE — 99153 MOD SED SAME PHYS/QHP EA: CPT

## 2021-10-23 PROCEDURE — B518YZZ FLUOROSCOPY OF SUPERIOR VENA CAVA USING OTHER CONTRAST: ICD-10-PCS | Performed by: SURGERY

## 2021-10-23 PROCEDURE — B517YZZ FLUOROSCOPY OF LEFT SUBCLAVIAN VEIN USING OTHER CONTRAST: ICD-10-PCS | Performed by: SURGERY

## 2021-10-23 PROCEDURE — 85730 THROMBOPLASTIN TIME PARTIAL: CPT | Performed by: HOSPITALIST

## 2021-10-23 PROCEDURE — 37187 VENOUS MECH THROMBECTOMY: CPT

## 2021-10-23 PROCEDURE — B51NYZZ FLUOROSCOPY OF LEFT UPPER EXTREMITY VEINS USING OTHER CONTRAST: ICD-10-PCS | Performed by: SURGERY

## 2021-10-23 PROCEDURE — 85049 AUTOMATED PLATELET COUNT: CPT | Performed by: HOSPITALIST

## 2021-10-23 PROCEDURE — 37212 THROMBOLYTIC VENOUS THERAPY: CPT

## 2021-10-23 PROCEDURE — 85027 COMPLETE CBC AUTOMATED: CPT | Performed by: HOSPITALIST

## 2021-10-23 PROCEDURE — 80048 BASIC METABOLIC PNL TOTAL CA: CPT | Performed by: SURGERY

## 2021-10-23 PROCEDURE — 99152 MOD SED SAME PHYS/QHP 5/>YRS: CPT

## 2021-10-23 PROCEDURE — 83735 ASSAY OF MAGNESIUM: CPT | Performed by: HOSPITALIST

## 2021-10-23 PROCEDURE — 80048 BASIC METABOLIC PNL TOTAL CA: CPT | Performed by: HOSPITALIST

## 2021-10-23 PROCEDURE — 85730 THROMBOPLASTIN TIME PARTIAL: CPT | Performed by: SURGERY

## 2021-10-23 PROCEDURE — 85027 COMPLETE CBC AUTOMATED: CPT | Performed by: SURGERY

## 2021-10-23 PROCEDURE — 85384 FIBRINOGEN ACTIVITY: CPT | Performed by: SURGERY

## 2021-10-23 PROCEDURE — 05C43ZZ EXTIRPATION OF MATTER FROM LEFT INNOMINATE VEIN, PERCUTANEOUS APPROACH: ICD-10-PCS | Performed by: SURGERY

## 2021-10-23 PROCEDURE — 3E03317 INTRODUCTION OF OTHER THROMBOLYTIC INTO PERIPHERAL VEIN, PERCUTANEOUS APPROACH: ICD-10-PCS | Performed by: SURGERY

## 2021-10-23 PROCEDURE — 75820 VEIN X-RAY ARM/LEG: CPT

## 2021-10-23 PROCEDURE — 85610 PROTHROMBIN TIME: CPT | Performed by: SURGERY

## 2021-10-23 RX ORDER — HEPARIN SODIUM AND DEXTROSE 10000; 5 [USP'U]/100ML; G/100ML
500 INJECTION INTRAVENOUS CONTINUOUS
Status: DISCONTINUED | OUTPATIENT
Start: 2021-10-23 | End: 2021-10-24

## 2021-10-23 RX ORDER — POTASSIUM CHLORIDE 1.5 G/1.77G
40 POWDER, FOR SOLUTION ORAL ONCE
Status: DISCONTINUED | OUTPATIENT
Start: 2021-10-23 | End: 2021-10-28

## 2021-10-23 RX ORDER — LIDOCAINE HYDROCHLORIDE 10 MG/ML
INJECTION, SOLUTION EPIDURAL; INFILTRATION; INTRACAUDAL; PERINEURAL
Status: COMPLETED
Start: 2021-10-23 | End: 2021-10-23

## 2021-10-23 RX ORDER — SODIUM CHLORIDE 9 MG/ML
INJECTION, SOLUTION INTRAVENOUS CONTINUOUS
Status: DISCONTINUED | OUTPATIENT
Start: 2021-10-23 | End: 2021-10-25

## 2021-10-23 RX ORDER — HEPARIN SODIUM 5000 [USP'U]/ML
INJECTION, SOLUTION INTRAVENOUS; SUBCUTANEOUS
Status: COMPLETED
Start: 2021-10-23 | End: 2021-10-23

## 2021-10-23 RX ORDER — LOPERAMIDE HYDROCHLORIDE 2 MG/1
2 CAPSULE ORAL 4 TIMES DAILY PRN
Status: DISCONTINUED | OUTPATIENT
Start: 2021-10-23 | End: 2021-10-28

## 2021-10-23 RX ORDER — MIDAZOLAM HYDROCHLORIDE 1 MG/ML
INJECTION INTRAMUSCULAR; INTRAVENOUS
Status: COMPLETED
Start: 2021-10-23 | End: 2021-10-23

## 2021-10-23 NOTE — PLAN OF CARE
Assumed patient care at 0730. Patient alert and oriented x 4. Belongings packed up and patient sent for thrombectomy. Plans to go to a new room - will deliver belongings once assigned.   Offered to call patient's wife but he stated [facility] wont answer the Eye

## 2021-10-23 NOTE — BRIEF OP NOTE
Vascular Surgery Cath Lab Brief Op Note  Patients Name:  Alexandra Dye  Operating Physician: Sulaiman Bryant MD  Location:  Cath Lab  MRN:   [id number]                                            YOB: 1945    Operation Date:    10/23/21

## 2021-10-23 NOTE — PLAN OF CARE
Received patient a/ox4. She completed most of her bowel prep just before midnight and has been NPO since. She reports clear liquid yellow BM's. No new complaints. She was updated on plan of care and verbalizes understanding.

## 2021-10-23 NOTE — PLAN OF CARE
Received patient a/ox4. O2 sats on RA 96%. Left arm elevated on pillow overnight. He has had multiple episodes of diarrhea since 0300. Paged Dr. Vanessa Campbell to request immodium but no orders received. Orders received to replace critical potassium this morning.  K

## 2021-10-23 NOTE — PROGRESS NOTES
Pulmonary Progress Note        NAME: Nikolai Bassem Dr: [id number] - MRN: [id number] - Age: 64year old - : 10/11/1945        Last 24hrs: No events overnight, back from cath lab, very groggy from anesthesia still    OBJECTIVE:   10/23/21  1130 10/ -echo done, awaiting report  -Heme following, defer long term anticoagulation to them  3. DVT  -PVS following for upper extremity clot  -s/p venogram and thrombolysis  -TPA infusing  -plan for cath lab tomorrow AM  4.  Hydropneumothorax  -due to scarring

## 2021-10-23 NOTE — PROGRESS NOTES
Out of room for procedure with vascular this AM  Chart reviewed  Will continue to follow.     Meghan Barbosa MD  [facility] Hospitalist  Pager: [phone]

## 2021-10-23 NOTE — PROGRESS NOTES
Vascular Surgery Progress Note    Patient examined this AM. Has had some improvement in swelling of the arm with anticoagulation and elevation. Still has 2+ edema all the way from the wrist up to the axilla.  Plan for left upper extremity venogram with phar

## 2021-10-23 NOTE — PROGRESS NOTES
[facility]  Report of Consultation    Dulce Elias Patient Status:  Observation    10/11/1945 MRN [id number]   [facility] 5NW-A Attending Diana Menendez MD   Hosp Day # 1 PCP Boy Nowak MD     ADMIT DATE AND TIME: 10/21/2021 11: History:   Diagnosis Date    CANCER 2007    BCCA    Glaucoma     HYPERLIPIDEMIA     Mesothelioma (pleural) ([facility] [address] - s/p bronch, R thoracotomy for pleurectomy on 10/16/2019. 9/24/2019    Unspecified sleep apnea PSG 4-1-13 TX 4-22-13    AHI 12 RDI 12 REM AHI (NORCO)  MG per tab 1 tablet, 1 tablet, Oral, Q4H PRN    latanoprost (XALATAN) 0.005 % ophthalmic solution 1 drop, 1 drop, Both Eyes, Nightly    melatonin tab 3 mg, 3 mg, Oral, Nightly    morphINE Sulfate ER (MS CONTIN) CR tab 30 mg, 30 mg, Oral, TID Creatinine 0.66 (L) 0.70 - 1.30 mg/dL    Calcium, Total 8.6 8.5 - 10.1 mg/dL    Calculated Osmolality 276 275 - 295 mOsm/kg    GFR, Non- 93 >=60    GFR, -American 107 >=60   CBC, Platelet;  No Differential    Collection Time: 10/22 Count    Collection Time: 10/23/21  4:37 AM   Result Value Ref Range    .0 150.0 - 450.0 10(3)uL   PTT, Activated    Collection Time: 10/23/21  4:37 AM   Result Value Ref Range    PTT 56.3 (H) 25.4 - 36.1 seconds       Recent Labs   Lab 10/21/21  23 by (CST): Bebeto Patel MD on 10/22/2021 at 8:05 AM    Dictated by (CST): Bebeto Patel MD on 10/22/2021 at 9:04 AM     Finalized by (CST): Bebeto Patel MD on 10/22/2021 at 9:04 AM              Result Date: 10/22/2021  CONCLUSION:  1.  When compared to 10/11/2021 c 10/22/2021 at 6:58 AM     Finalized by (CST): Glida Foster DO on 10/22/2021 at 7:00 AM       [place] [facility] (ABK=[id number])    Result Date: 10/22/2021  CONCLUSION:  Negative exam, no evidence of lower extremity DVT.     Dictated by (CST

## 2021-10-24 ENCOUNTER — APPOINTMENT (OUTPATIENT)
Dept: INTERVENTIONAL RADIOLOGY/VASCULAR | Facility: HOSPITAL | Age: 76
DRG: 270 | End: 2021-10-24
Attending: SURGERY
Payer: MEDICARE

## 2021-10-24 PROCEDURE — B518YZZ FLUOROSCOPY OF SUPERIOR VENA CAVA USING OTHER CONTRAST: ICD-10-PCS | Performed by: SURGERY

## 2021-10-24 PROCEDURE — 85730 THROMBOPLASTIN TIME PARTIAL: CPT | Performed by: SURGERY

## 2021-10-24 PROCEDURE — 99152 MOD SED SAME PHYS/QHP 5/>YRS: CPT

## 2021-10-24 PROCEDURE — 85027 COMPLETE CBC AUTOMATED: CPT | Performed by: SURGERY

## 2021-10-24 PROCEDURE — 05743ZZ DILATION OF LEFT INNOMINATE VEIN, PERCUTANEOUS APPROACH: ICD-10-PCS | Performed by: SURGERY

## 2021-10-24 PROCEDURE — 05C43ZZ EXTIRPATION OF MATTER FROM LEFT INNOMINATE VEIN, PERCUTANEOUS APPROACH: ICD-10-PCS | Performed by: SURGERY

## 2021-10-24 PROCEDURE — 84132 ASSAY OF SERUM POTASSIUM: CPT | Performed by: HOSPITALIST

## 2021-10-24 PROCEDURE — 37248 TRLUML BALO ANGIOP 1ST VEIN: CPT

## 2021-10-24 PROCEDURE — B51NYZZ FLUOROSCOPY OF LEFT UPPER EXTREMITY VEINS USING OTHER CONTRAST: ICD-10-PCS | Performed by: SURGERY

## 2021-10-24 PROCEDURE — 99153 MOD SED SAME PHYS/QHP EA: CPT

## 2021-10-24 PROCEDURE — 85610 PROTHROMBIN TIME: CPT | Performed by: SURGERY

## 2021-10-24 PROCEDURE — 3E03317 INTRODUCTION OF OTHER THROMBOLYTIC INTO PERIPHERAL VEIN, PERCUTANEOUS APPROACH: ICD-10-PCS | Performed by: SURGERY

## 2021-10-24 PROCEDURE — 85384 FIBRINOGEN ACTIVITY: CPT | Performed by: SURGERY

## 2021-10-24 PROCEDURE — B517YZZ FLUOROSCOPY OF LEFT SUBCLAVIAN VEIN USING OTHER CONTRAST: ICD-10-PCS | Performed by: SURGERY

## 2021-10-24 PROCEDURE — 75820 VEIN X-RAY ARM/LEG: CPT

## 2021-10-24 PROCEDURE — 80048 BASIC METABOLIC PNL TOTAL CA: CPT | Performed by: SURGERY

## 2021-10-24 PROCEDURE — 37188 VEN MECHNL THRMBC REPEAT TX: CPT

## 2021-10-24 RX ORDER — HEPARIN SODIUM 5000 [USP'U]/ML
INJECTION, SOLUTION INTRAVENOUS; SUBCUTANEOUS
Status: COMPLETED
Start: 2021-10-24 | End: 2021-10-24

## 2021-10-24 RX ORDER — HEPARIN SODIUM 5000 [USP'U]/ML
30 INJECTION, SOLUTION INTRAVENOUS; SUBCUTANEOUS ONCE
Status: COMPLETED | OUTPATIENT
Start: 2021-10-24 | End: 2021-10-24

## 2021-10-24 RX ORDER — POTASSIUM CHLORIDE 20 MEQ/1
40 TABLET, EXTENDED RELEASE ORAL EVERY 4 HOURS
Status: COMPLETED | OUTPATIENT
Start: 2021-10-24 | End: 2021-10-24

## 2021-10-24 RX ORDER — HEPARIN SODIUM AND DEXTROSE 10000; 5 [USP'U]/100ML; G/100ML
INJECTION INTRAVENOUS CONTINUOUS
Status: DISCONTINUED | OUTPATIENT
Start: 2021-10-24 | End: 2021-10-25

## 2021-10-24 RX ORDER — MIDAZOLAM HYDROCHLORIDE 1 MG/ML
INJECTION INTRAMUSCULAR; INTRAVENOUS
Status: COMPLETED
Start: 2021-10-24 | End: 2021-10-24

## 2021-10-24 RX ORDER — LIDOCAINE HYDROCHLORIDE 10 MG/ML
INJECTION, SOLUTION EPIDURAL; INFILTRATION; INTRACAUDAL; PERINEURAL
Status: COMPLETED
Start: 2021-10-24 | End: 2021-10-24

## 2021-10-24 RX ORDER — POTASSIUM CHLORIDE 20 MEQ/1
40 TABLET, EXTENDED RELEASE ORAL ONCE
Status: COMPLETED | OUTPATIENT
Start: 2021-10-24 | End: 2021-10-24

## 2021-10-24 NOTE — PROGRESS NOTES
Pulmonary Progress Note        NAME: Nikolai Bassem Dr: [id number] - MRN: [id number] - Age: 64year old - : 10/11/1945        Last 24hrs: No events overnight, back from cath lab, frustrated with things and concerned about what this all means for h below  -wean O2 as tolerated, on 2L currently  2. PE  -RF of underlying malignancy and recent port placement  -heparin gtt   -echo done, still awaiting results  -Heme following, defer long term anticoagulation to them  3.  DVT  -PVS following for upper extr

## 2021-10-24 NOTE — OPERATIVE REPORT
Vascular Surgery Cath Lab Operative Note  Patients Name:  Ronal Kahty  Operating Physician: MD Sophia [facility]  MRN:   [id number]                                            YOB: 1945     Operation Date:    10/24/21 distal to the Port-A-Cath however the innominate vein centrally remained occluded. Repeat percutaneous thrombectomy was performed again using the AngioJet proxy catheter with removal of 130 mL of thrombectomy volume.   Despite this there was persistent occ

## 2021-10-24 NOTE — DIETARY NOTE
[facility]    NUTRITION ASSESSMENT    Pt does not meet malnutrition criteria. NUTRITION INTERVENTION:    1. Meal and Snacks - advance to least restrictive diet monitor patient po intake. Encourage adequate po of appropriate diet.   2. Medical Food 2. Fluid Accumulation: not assessed      NUTRITION PRESCRIPTION:   Calories: 5862-3375 calories/day (25-30 calories per kg)  Protein:  grams protein/day (1.2-1.5 grams protein per kg)  Fluid: ~1 ml/kcal or per MD discretion    NUTRITION DIAGNOSI

## 2021-10-24 NOTE — OPERATIVE REPORT
Vascular Surgery Cath Lab Operative Note  Patients Name:  Jinny Hartley  Operating Physician: MD Joan Andujar [facility]  MRN:   [id number]                                            YOB: 1945     Operation Date:    10/23/21 micropuncture needle, wire and sheath. This was exchanged over an 035 braided guidewire for a 6 Italian sheath. Initial venogram was performed which showed occlusion of the distal brachial vein axillary vein and central veins.   A glide advantage wire was

## 2021-10-24 NOTE — BRIEF OP NOTE
Vascular Surgery Cath Lab Brief Op Note  Patients Name:  Joanna Oseguera  Operating Physician: Debbie Leigh MD  Location:  Cath Lab  MRN:   [id number]                                            YOB: 1945    Operation Date:    10/24/21

## 2021-10-24 NOTE — PLAN OF CARE
Received pt at 1930. A/O x4. TPA/Heparin infusing via fountain/sheath in the L brachial. + CMS. NPO for relook in AM. Plan of care explained. Continue to monitor.

## 2021-10-24 NOTE — PLAN OF CARE
To cath lab for second look post TPA. Returned to unit 1015, ace wrap to LUE, fingers cool, dusky, +cap refill, +radial pulse, site soft, no hematoma. 1500 Color and temperature improved. Heparin gtt infusing per DVT/PE protocol.  Plan for port a cath remov

## 2021-10-24 NOTE — PLAN OF CARE
1045 Received from cath lab, A/Ox4, L brachial fountain cath infusing TPA and heparin, site soft, no hematoma, radial pulse palpable, hand cool, fingertips dusky, + cap refill. L arm +3 pitting edema, +CMS, Dr. Sheila Bear at bedside.  1200 unable to draw blood f

## 2021-10-24 NOTE — PROGRESS NOTES
Out of room for procedure. Chart reviewed. Will see this afternoon.     Martínez Sow MD  [facility] Hospitalist  Pager: [phone]

## 2021-10-25 ENCOUNTER — ANESTHESIA (OUTPATIENT)
Dept: CARDIAC SURGERY | Facility: HOSPITAL | Age: 76
DRG: 270 | End: 2021-10-25
Payer: MEDICARE

## 2021-10-25 ENCOUNTER — ANESTHESIA EVENT (OUTPATIENT)
Dept: CARDIAC SURGERY | Facility: HOSPITAL | Age: 76
DRG: 270 | End: 2021-10-25
Payer: MEDICARE

## 2021-10-25 PROCEDURE — 87205 SMEAR GRAM STAIN: CPT | Performed by: SURGERY

## 2021-10-25 PROCEDURE — 87070 CULTURE OTHR SPECIMN AEROBIC: CPT | Performed by: SURGERY

## 2021-10-25 PROCEDURE — 94640 AIRWAY INHALATION TREATMENT: CPT

## 2021-10-25 PROCEDURE — 02PY33Z REMOVAL OF INFUSION DEVICE FROM GREAT VESSEL, PERCUTANEOUS APPROACH: ICD-10-PCS | Performed by: SURGERY

## 2021-10-25 PROCEDURE — 85730 THROMBOPLASTIN TIME PARTIAL: CPT | Performed by: SURGERY

## 2021-10-25 PROCEDURE — 87206 SMEAR FLUORESCENT/ACID STAI: CPT | Performed by: SURGERY

## 2021-10-25 PROCEDURE — 87102 FUNGUS ISOLATION CULTURE: CPT | Performed by: SURGERY

## 2021-10-25 PROCEDURE — 87176 TISSUE HOMOGENIZATION CULTR: CPT | Performed by: SURGERY

## 2021-10-25 PROCEDURE — 87075 CULTR BACTERIA EXCEPT BLOOD: CPT | Performed by: SURGERY

## 2021-10-25 PROCEDURE — 0JPT0WZ REMOVAL OF TOTALLY IMPLANTABLE VASCULAR ACCESS DEVICE FROM TRUNK SUBCUTANEOUS TISSUE AND FASCIA, OPEN APPROACH: ICD-10-PCS | Performed by: SURGERY

## 2021-10-25 PROCEDURE — 80048 BASIC METABOLIC PNL TOTAL CA: CPT | Performed by: SURGERY

## 2021-10-25 PROCEDURE — 87116 MYCOBACTERIA CULTURE: CPT | Performed by: SURGERY

## 2021-10-25 PROCEDURE — 85027 COMPLETE CBC AUTOMATED: CPT | Performed by: SURGERY

## 2021-10-25 PROCEDURE — 85049 AUTOMATED PLATELET COUNT: CPT | Performed by: SURGERY

## 2021-10-25 PROCEDURE — 87076 CULTURE ANAEROBE IDENT EACH: CPT | Performed by: SURGERY

## 2021-10-25 PROCEDURE — 85730 THROMBOPLASTIN TIME PARTIAL: CPT | Performed by: INTERNAL MEDICINE

## 2021-10-25 RX ORDER — HEPARIN SODIUM 5000 [USP'U]/ML
30 INJECTION, SOLUTION INTRAVENOUS; SUBCUTANEOUS ONCE
Status: DISCONTINUED | OUTPATIENT
Start: 2021-10-25 | End: 2021-10-25

## 2021-10-25 RX ORDER — LIDOCAINE HYDROCHLORIDE 10 MG/ML
INJECTION, SOLUTION EPIDURAL; INFILTRATION; INTRACAUDAL; PERINEURAL AS NEEDED
Status: DISCONTINUED | OUTPATIENT
Start: 2021-10-25 | End: 2021-10-25 | Stop reason: SURG

## 2021-10-25 RX ORDER — CEFAZOLIN SODIUM/WATER 2 G/20 ML
SYRINGE (ML) INTRAVENOUS AS NEEDED
Status: DISCONTINUED | OUTPATIENT
Start: 2021-10-25 | End: 2021-10-25 | Stop reason: SURG

## 2021-10-25 RX ORDER — HYDROMORPHONE HYDROCHLORIDE 1 MG/ML
0.4 INJECTION, SOLUTION INTRAMUSCULAR; INTRAVENOUS; SUBCUTANEOUS EVERY 5 MIN PRN
Status: CANCELLED | OUTPATIENT
Start: 2021-10-25 | End: 2021-10-26

## 2021-10-25 RX ORDER — SODIUM CHLORIDE 9 MG/ML
INJECTION, SOLUTION INTRAVENOUS CONTINUOUS PRN
Status: DISCONTINUED | OUTPATIENT
Start: 2021-10-25 | End: 2021-10-25 | Stop reason: SURG

## 2021-10-25 RX ORDER — POTASSIUM CHLORIDE 20 MEQ/1
40 TABLET, EXTENDED RELEASE ORAL ONCE
Status: COMPLETED | OUTPATIENT
Start: 2021-10-25 | End: 2021-10-25

## 2021-10-25 RX ORDER — HEPARIN SODIUM 5000 [USP'U]/ML
30 INJECTION, SOLUTION INTRAVENOUS; SUBCUTANEOUS ONCE
Status: COMPLETED | OUTPATIENT
Start: 2021-10-25 | End: 2021-10-25

## 2021-10-25 RX ORDER — HEPARIN SODIUM AND DEXTROSE 10000; 5 [USP'U]/100ML; G/100ML
500 INJECTION INTRAVENOUS CONTINUOUS PRN
Status: DISCONTINUED | OUTPATIENT
Start: 2021-10-25 | End: 2021-10-26

## 2021-10-25 RX ORDER — NALOXONE HYDROCHLORIDE 0.4 MG/ML
80 INJECTION, SOLUTION INTRAMUSCULAR; INTRAVENOUS; SUBCUTANEOUS AS NEEDED
Status: CANCELLED | OUTPATIENT
Start: 2021-10-25 | End: 2021-10-26

## 2021-10-25 RX ORDER — BUPIVACAINE HYDROCHLORIDE 5 MG/ML
INJECTION, SOLUTION EPIDURAL; INTRACAUDAL AS NEEDED
Status: DISCONTINUED | OUTPATIENT
Start: 2021-10-25 | End: 2021-10-25 | Stop reason: HOSPADM

## 2021-10-25 RX ORDER — ONDANSETRON 2 MG/ML
4 INJECTION INTRAMUSCULAR; INTRAVENOUS AS NEEDED
Status: CANCELLED | OUTPATIENT
Start: 2021-10-25 | End: 2021-10-26

## 2021-10-25 RX ORDER — SODIUM CHLORIDE, SODIUM LACTATE, POTASSIUM CHLORIDE, CALCIUM CHLORIDE 600; 310; 30; 20 MG/100ML; MG/100ML; MG/100ML; MG/100ML
INJECTION, SOLUTION INTRAVENOUS CONTINUOUS
Status: CANCELLED | OUTPATIENT
Start: 2021-10-25

## 2021-10-25 RX ORDER — MIDAZOLAM HYDROCHLORIDE 1 MG/ML
INJECTION INTRAMUSCULAR; INTRAVENOUS AS NEEDED
Status: DISCONTINUED | OUTPATIENT
Start: 2021-10-25 | End: 2021-10-25 | Stop reason: SURG

## 2021-10-25 RX ADMIN — LIDOCAINE HYDROCHLORIDE 50 MG: 10 INJECTION, SOLUTION EPIDURAL; INFILTRATION; INTRACAUDAL; PERINEURAL at 17:08:00

## 2021-10-25 RX ADMIN — SODIUM CHLORIDE: 9 INJECTION, SOLUTION INTRAVENOUS at 17:00:00

## 2021-10-25 RX ADMIN — CEFAZOLIN SODIUM/WATER 2 G: 2 G/20 ML SYRINGE (ML) INTRAVENOUS at 17:08:00

## 2021-10-25 RX ADMIN — SODIUM CHLORIDE: 9 INJECTION, SOLUTION INTRAVENOUS at 17:34:00

## 2021-10-25 RX ADMIN — MIDAZOLAM HYDROCHLORIDE 2 MG: 1 INJECTION INTRAMUSCULAR; INTRAVENOUS at 17:08:00

## 2021-10-25 NOTE — ANESTHESIA POSTPROCEDURE EVALUATION
[address] Patient Status:  Inpatient   Age/Gender 64year old male MRN [id number]   Location [address] [place] [address] Attending Bryce Jesus MD   Hosp Day # 3 PCP Sonyn Adhikari MD       Anesthesia Post-op Note

## 2021-10-25 NOTE — PROGRESS NOTES
[address] Patient Status:  Inpatient    10/11/1945 MRN [id number]   [facility] 7NE-A Attending Vik Keene MD   Hosp Day # 3 PCP Tran Ma MD     Pulm / Critical Care Progress Note     S: pt states he positive BS.    Extremity: LUE edema, wrapped in dressing         Recent Labs   Lab 10/24/21  0432 10/24/21  0817 10/25/21  0706   WBC 9.5 9.7 9.8   HGB 12.3* 11.7* 13.0   HCT 38.0* 34.1* 39.0   .0* 151.0 155.0     Recent Labs   Lab 10/24/21  0055 10

## 2021-10-25 NOTE — PROGRESS NOTES
I discussed the findings of the portable with the wife and also that I removed the left arm compression dressing with the wife. Swelling in the left arm has significantly decreased with compression alone.   Resume heparin at 8 PM tonight at flat rate 500 u

## 2021-10-25 NOTE — PROGRESS NOTES
[facility]  Progress Note    Cramer Balling Patient Status:  Inpatient    10/11/1945 MRN [id number]   [facility] 7NE-A Attending Gabi Trinidad MD   [facility] Day # 3 PCP Karen Loya MD     ADMISSION DATE AND TIME: 10/21/2021 11:1 Calcium, Total 7.4 (L) 8.5 - 10.1 mg/dL    Calculated Osmolality 288 275 - 295 mOsm/kg    GFR, Non- 108 >=60    GFR, -American 125 >=60   PTT, Activated    Collection Time: 10/24/21  4:16 PM   Result Value Ref Range    PTT 43.9 (H) 2 TPA did not work  On Heparin [employer] removal planned for today   Will follow along      Thank you for allowing me to participate in the care of your patient.   Valentín Shipley MD

## 2021-10-25 NOTE — BRIEF OP NOTE
Pre-Operative Diagnosis: Catheter associated DVT left subclavian artery     Post-Operative Diagnosis: same     Procedure Performed:   Removal of left port-a-cath    Surgeon(s) and Role:     Vikki Mg MD - Primary    Assistant(s):        Surgical F

## 2021-10-25 NOTE — PROGRESS NOTES
[facility] Hospitalist Progress Note     PCP: Jaxon Begum MD    Chief Complaint: follow-up    Overnight/Interim Events:      SUBJECTIVE:  Seen c wife and DIL at bedside, answered multiple questions. Pt c some SOB, on heparin gtt.      OBJECTIVE:  Temp:  [97.2 137.0* 151.0 155.0   BAND 28  --   --   --   --   --   --   --   --   --    INR 1.11   < > 1.31*  --  1.37*  --  1.64* 1.46* 1.52*  --     < > = values in this interval not displayed.        Recent Labs   Lab 10/22/21  1116 10/22/21  1116 10/23/21  3247 10/ extent of clot of swelling  - Plan for L port removal as felt to be source of clot   of note     # Small PEs  - IV heparin  - Pulm was consulted in the ER, apprec  - 2/2 malignancy/port  - echo P  - heme following      # SOB  - Not hypoxic  - PE's are smal

## 2021-10-25 NOTE — PLAN OF CARE
Assumed care of pt around 1930. Alert. Huslia. NSR. VSS on RA while awake, cpap for sleep. Bilateral radial and pedal pulses palpable. LUE and BLE edematous. Heparin per PE/DVT protocol. Plan for port removal today. Report given to Siddharth Meza RN.  Awaiting transfer

## 2021-10-25 NOTE — ANESTHESIA PREPROCEDURE EVALUATION
PRE-OP EVALUATION    Patient Name: Joanna Oseguera    Admit Diagnosis: Left arm swelling [M79.89]  Dyspnea, unspecified type [R06.00]    Pre-op Diagnosis: infected port    left port removal    Anesthesia Procedure: left port removal (Left )    Surgeon(s) [COMPLETED] Heparin Sodium (Porcine) 5000 UNIT/ML injection, , ,   [COMPLETED] Sterile Water for Injection injection, , ,   [COMPLETED] alteplase (ACTIVASE) 2 MG injection, , ,   [COMPLETED] Midazolam HCl (VERSED) 2 MG/2ML injection, , ,   [COMPLETED] fe 100 UNIT/ML lock flush 500 Units, 5 mL, Intravenous, PRN        Outpatient Medications:   morphINE Sulfate ER 30 MG Oral Tab CR, Take 30 mg by mouth in the morning, at noon, and at bedtime.  Morning, 1500, 2230, Disp: 1 tablet, Rfl: 0, 10/21/2021 at 1500  p Cardiovascular  Comment: Left subclavian vein DVT with port cath present. Presents for removal of tiny cath    ECG reviewed.                (+) hyperlipidemia                                  Endo/Other                                  Pulmonary  Comment BUN 11 10/25/2021    BUN 14.0 10/19/2021    CREATSERUM 0.59 (L) 10/25/2021    CREATSERUM 0.78 10/19/2021     (H) 10/25/2021     (H) 10/19/2021    CA 7.9 (L) 10/25/2021     Lab Results   Component Value Date    INR 1.52 (H) 10/24/2021

## 2021-10-25 NOTE — PLAN OF CARE
Assumed care of patient as a transfer from [facility] 0868 @ 0600. Patient ambulatory from wheelchair to bed. Dyspnea on exertion noted, Room air, VSS. Heparin gtt infusing per protocol.  Edema noted to left upper arm, wrapped in ace wrap, +2 palpable radial pulses bi

## 2021-10-26 PROCEDURE — 85730 THROMBOPLASTIN TIME PARTIAL: CPT | Performed by: SURGERY

## 2021-10-26 PROCEDURE — 94640 AIRWAY INHALATION TREATMENT: CPT

## 2021-10-26 PROCEDURE — 80048 BASIC METABOLIC PNL TOTAL CA: CPT | Performed by: SURGERY

## 2021-10-26 PROCEDURE — 85027 COMPLETE CBC AUTOMATED: CPT | Performed by: SURGERY

## 2021-10-26 PROCEDURE — 84132 ASSAY OF SERUM POTASSIUM: CPT | Performed by: SURGERY

## 2021-10-26 PROCEDURE — 85730 THROMBOPLASTIN TIME PARTIAL: CPT | Performed by: INTERNAL MEDICINE

## 2021-10-26 RX ORDER — HEPARIN SODIUM AND DEXTROSE 10000; 5 [USP'U]/100ML; G/100ML
18 INJECTION INTRAVENOUS ONCE
Status: COMPLETED | OUTPATIENT
Start: 2021-10-26 | End: 2021-10-26

## 2021-10-26 RX ORDER — HEPARIN SODIUM AND DEXTROSE 10000; 5 [USP'U]/100ML; G/100ML
INJECTION INTRAVENOUS CONTINUOUS
Status: DISPENSED | OUTPATIENT
Start: 2021-10-27 | End: 2021-10-27

## 2021-10-26 RX ORDER — DORZOLAMIDE HCL 20 MG/ML
1 SOLUTION/ DROPS OPHTHALMIC 2 TIMES DAILY
Status: DISCONTINUED | OUTPATIENT
Start: 2021-10-26 | End: 2021-10-28

## 2021-10-26 RX ORDER — POTASSIUM CHLORIDE 20 MEQ/1
40 TABLET, EXTENDED RELEASE ORAL ONCE
Status: COMPLETED | OUTPATIENT
Start: 2021-10-26 | End: 2021-10-26

## 2021-10-26 NOTE — PLAN OF CARE
Assumed care of pt at [address] [facility], pt up from pacu at change of shift. Dressing to left chest intact gauze and tegaderm   C/d/i no drainage noted. Pt denies pain.  Ace wrap to left arm in place fingers cool to touch and pale no cyanosis noted, pt able to wiggle fi

## 2021-10-26 NOTE — PROGRESS NOTES
[facility] Hospitalist Progress Note     PCP: Elena Centeno MD    Chief Complaint: follow-up    Overnight/Interim Events:      SUBJECTIVE:  Seen c wife and at bedside, answered multiple questions.  Pt feels arm is \"like a rock\", pain from elbow to wrist. Some 88.6   .0   < > 197.0   < > 192.0 131.0*  --  137.0* 151.0 155.0   BAND 28  --   --   --   --   --   --   --   --   --    INR 1.11   < > 1.31*  --  1.37*  --  1.64* 1.46* 1.52*  --     < > = values in this interval not displayed.        Recent Labs on, attempted thrombectomy given extent of clot of swelling  - s/p L port removal 10/25 as felt to be source of clot, noted LUE swelling improved      # Small PEs  - IV heparin  - Pulm was consulted in the ER, apprec  - 2/2 malignancy/port  - echo EF 60%

## 2021-10-26 NOTE — PROGRESS NOTES
[facility]  Progress Note    [place] Patient Status:  Inpatient    10/11/1945 MRN [id number]   [facility] 7NE-A Attending Kali Mercer MD   [facility] Day # 4 PCP Guy Mar MD     ADMISSION DATE AND TIME: 10/21/2021 11:1 Both Eyes Nightly   • melatonin  3 mg Oral Nightly   • morphINE ER  30 mg Oral TID   • predniSONE  30 mg Oral Daily   • tamsulosin  0.4 mg Oral After dinner   • LORazepam  2 mg Oral Nightly         ASSESSMENT AND PLAN:       Stephanie Savage is a 64 year o

## 2021-10-26 NOTE — PROGRESS NOTES
[address] Patient Status:  Inpatient    10/11/1945 MRN [id number]   [facility] 7NE-A Attending Simon Crouch MD   Hosp Day # 4 PCP Radha Treadwell MD     Pulm / Critical Care Progress Note     S: port removed 10/24/21  0817 10/25/21  0706   WBC 9.5 9.7 9.8   HGB 12.3* 11.7* 13.0   HCT 38.0* 34.1* 39.0   .0* 151.0 155.0     Recent Labs   Lab 10/24/21  0055 10/24/21  0432 10/24/21  0817   INR 1.64* 1.46* 1.52*         Recent Labs   Lab 10/23/21  1220 10/23

## 2021-10-27 PROCEDURE — 97166 OT EVAL MOD COMPLEX 45 MIN: CPT

## 2021-10-27 PROCEDURE — 97530 THERAPEUTIC ACTIVITIES: CPT

## 2021-10-27 PROCEDURE — 85730 THROMBOPLASTIN TIME PARTIAL: CPT | Performed by: SURGERY

## 2021-10-27 PROCEDURE — 84132 ASSAY OF SERUM POTASSIUM: CPT | Performed by: INTERNAL MEDICINE

## 2021-10-27 PROCEDURE — 97162 PT EVAL MOD COMPLEX 30 MIN: CPT

## 2021-10-27 PROCEDURE — 85049 AUTOMATED PLATELET COUNT: CPT | Performed by: SURGERY

## 2021-10-27 PROCEDURE — 97116 GAIT TRAINING THERAPY: CPT

## 2021-10-27 RX ORDER — POTASSIUM CHLORIDE 20 MEQ/1
40 TABLET, EXTENDED RELEASE ORAL EVERY 4 HOURS
Status: COMPLETED | OUTPATIENT
Start: 2021-10-27 | End: 2021-10-27

## 2021-10-27 RX ORDER — POTASSIUM CHLORIDE 20 MEQ/1
40 TABLET, EXTENDED RELEASE ORAL ONCE
Status: COMPLETED | OUTPATIENT
Start: 2021-10-27 | End: 2021-10-27

## 2021-10-27 NOTE — PROGRESS NOTES
[facility]  Progress Note    Andres Butler Patient Status:  Inpatient    10/11/1945 MRN [id number]   [facility] 7NE-A Attending Coby Pitt MD   [address] Day # 5 PCP Clifford [place] MD     ADMISSION DATE AND TIME: 10/21/2021 11:1 Platelet Count    Collection Time: 10/27/21  6:27 AM   Result Value Ref Range    .0 150.0 - 450.0 10(3)uL   PTT, Activated    Collection Time: 10/27/21  6:27 AM   Result Value Ref Range    PTT 87.6 (H) 25.4 - 36.1 seconds       Hospital Encounter

## 2021-10-27 NOTE — PROGRESS NOTES
Resting comfortably  Dressing removed due to hand swelling  Needs outpatient compression garment  No plan for further intervention- risk of bleeding into port space    Can be transitioned to oral anticoagulant   Can follow up at [facility]

## 2021-10-27 NOTE — PROGRESS NOTES
[facility] Hospitalist Progress Note     PCP: Leandra Pineda MD    Chief Complaint: follow-up    Overnight/Interim Events:      SUBJECTIVE:  Seen c wife and at bedside. Working c therapy. 2/10 SOB c walking. On heparin gtt. Arm is better.     OBJECTIVE:  Temp:  [ 12.9*  --    MCV 85.9   < > 87.3   < > 87.6   < > 88.3  --   --  89.8 87.7 88.6 89.8  --    .0   < > 197.0   < > 192.0   < > 131.0*   < >  --  137.0* 151.0 155.0 170.0 159.0   BAND 28  --   --   --   --   --   --   --   --   --   --   --   --   -- acetaminophen, Heparin Lock Flush       Assessment/Plan:      64year old male with PMH sig for mesothelioma currently on immunotherapy, oncologist is at [facility] here with weakness, SOB and left arm swelling    # Left arm pain/swelling  - Extensive LUE DVT  -

## 2021-10-27 NOTE — CM/SW NOTE
PT/OT recommend [place] [phone] services. Referrals made via [facility]. Will need to provide list and choice to patient once list generated. Order/F2F entered.     Cipriano Jones LCSW

## 2021-10-27 NOTE — PHYSICAL THERAPY NOTE
PHYSICAL THERAPY EVALUATION - INPATIENT     Room Number: 3165/4296-N  Evaluation Date: 10/27/2021  Type of Evaluation: Initial  Physician Order: PT Eval and Treat    Presenting Problem: dyspnea, PE, LUE DVT  Reason for Therapy: Mobility Dysfunction a the above deficits to assist patient in returning to prior to level of function. DISCHARGE RECOMMENDATIONS  PT Discharge Recommendations: Intermittent Supervision;Home with home health PT/OT    PLAN  PT Treatment Plan: Bed mobility; Body mechanics; Coordina Standing: Poor +    ADDITIONAL TESTS                                    ACTIVITY TOLERANCE                         O2 WALK  Oxygen Therapy  SPO2% Ambulation on Room Air: 80    NEUROLOGICAL FINDINGS                        AM-PAC '6-Clicks' INPATIENT SHORT F

## 2021-10-27 NOTE — PLAN OF CARE
Assumed care at [address] [facility].    A&Ox4, RA, NSR  Up in bathroom   Denying pain   Left arm wrapped, elevated, cool to touch   Bilateral legs with pitting edema    Heparin gtt infusing, PTT draw in AM  Left chest incision C/D/I  Will continue to monitor

## 2021-10-27 NOTE — DIETARY NOTE
[facility]    NUTRITION ASSESSMENT    Pt does not meet malnutrition criteria. NUTRITION INTERVENTION:    1. Meal and Snacks -  Encourage adequate po of appropriate diet. Diet liberalized to General Diet   2.  Medical Food Supplements - Pt reports Accuchecks?  No     Oral Supplements: n/a    Percent Meals Eaten (last 3 days)     Date/Time Percent Meals Eaten (%)    10/24/21 1200 100 %    10/24/21 1830 100 %    10/26/21 0900 100 %    10/26/21 1300 100 %    10/26/21 1700 100 %            FOOD/NUTRITION

## 2021-10-27 NOTE — PLAN OF CARE
Problem: Patient/Family Goals  Goal: Patient/Family Long Term Goal  Description: Patient's Long Term Goal: To go home.      Interventions:  - VSS  - pain management  - site checks    - See additional Care Plan goals for specific interventions  Outcome: Pr

## 2021-10-28 VITALS
SYSTOLIC BLOOD PRESSURE: 110 MMHG | TEMPERATURE: 98 F | WEIGHT: 165.69 LBS | HEART RATE: 82 BPM | BODY MASS INDEX: 26 KG/M2 | RESPIRATION RATE: 16 BRPM | OXYGEN SATURATION: 95 % | DIASTOLIC BLOOD PRESSURE: 59 MMHG

## 2021-10-28 PROCEDURE — 80048 BASIC METABOLIC PNL TOTAL CA: CPT | Performed by: INTERNAL MEDICINE

## 2021-10-28 PROCEDURE — 83735 ASSAY OF MAGNESIUM: CPT | Performed by: INTERNAL MEDICINE

## 2021-10-28 PROCEDURE — 85730 THROMBOPLASTIN TIME PARTIAL: CPT | Performed by: SURGERY

## 2021-10-28 PROCEDURE — 85049 AUTOMATED PLATELET COUNT: CPT | Performed by: SURGERY

## 2021-10-28 PROCEDURE — 80048 BASIC METABOLIC PNL TOTAL CA: CPT | Performed by: HOSPITALIST

## 2021-10-28 PROCEDURE — 84132 ASSAY OF SERUM POTASSIUM: CPT | Performed by: INTERNAL MEDICINE

## 2021-10-28 PROCEDURE — 85027 COMPLETE CBC AUTOMATED: CPT | Performed by: INTERNAL MEDICINE

## 2021-10-28 RX ORDER — MAGNESIUM OXIDE 400 MG (241.3 MG MAGNESIUM) TABLET
400 TABLET ONCE
Status: COMPLETED | OUTPATIENT
Start: 2021-10-28 | End: 2021-10-28

## 2021-10-28 RX ORDER — POTASSIUM CHLORIDE 20 MEQ/1
40 TABLET, EXTENDED RELEASE ORAL EVERY 4 HOURS
Status: COMPLETED | OUTPATIENT
Start: 2021-10-28 | End: 2021-10-28

## 2021-10-28 NOTE — PLAN OF CARE
Assumed care @ 0730  NSR/ST on tele with HR <110. 5min run of afib RVR, pt asymptomatic. Dr. Alyce Meneses updated  Critical K 2.9. MD paged. Replaced per protocol  LUE 2+ pitting edema. BLE 3+ pitting edema. Pulses palpable.  Elevated on pillows  L chest dressing

## 2021-10-28 NOTE — CM/SW NOTE
[facility] accepted pt for [facility]. Evans Hicks from [facility] will see pt and offer [facility] agency choice.

## 2021-10-28 NOTE — OCCUPATIONAL THERAPY NOTE
OCCUPATIONAL THERAPY EVALUATION - INPATIENT     Room Number: 2332/2264-L  Evaluation Date: 10/27/2021  Type of Evaluation: Initial  Presenting Problem: dyspnea, LUE DVT, PE    Physician Order: IP Consult to Occupational Therapy  Reason for Therapy: ADL/IAD all mobility and I/ADLs, including driving. He has had increased weakness and decreased appetite over the last few weeks per spouse. Lives in a 2 level home and does not typically use assistive devices.        SUBJECTIVE   \"I have mesothelioma,  I'm on t (AM-PAC Scale): 40.22  CMS Modifier (G-Code): CK    FUNCTIONAL TRANSFER ASSESSMENT  Supine to Sit : Supervision  Sit to Stand: Supervision    Skilled Therapy Provided:   Patient in bed, reported his breathing is better and LUE swelling has greatly improved and would benefit from skilled inpatient OT to address the above deficits, maximizing patient’s ability to return safely to his prior level of function.   The AM-SONJA ' '6-Clicks' Inpatient Daily Activity Short Form was completed and  this patient  is demparris supine:  with modified independent  Patient will transfer to toilet:  with supervision    UE Exercise Program Goal  Patient will be supervision with right AROM HEP (home exercise program).     Additional Goals:  Clinician to administer PHQ9    PPE worn by w

## 2021-10-28 NOTE — PLAN OF CARE
Assumed patient care at 0730  Patient alert and oriented X4, on room air, VSS, NSR, PAC on tele  Up to bathroom with assist  Electrolytes replaced per protocol   Ambulate X3, PT/OT recommending home with home health, referrals sent per social work  Patient

## 2021-10-28 NOTE — PLAN OF CARE
Assumed care of pt this pm. Pt is alert and oreinted. RA. VSS. NSR;Tele. Denies pain. L chest inc c/d/i. LUE edema +3; elevated. RUE edema +1. BLE edema +4. Elevated the extremities. Plan to discharge home with [facility] once medically cleared.   Needs attended to

## 2021-10-28 NOTE — PROGRESS NOTES
[facility]  Progress Note    Quinn Lim Patient Status:  Inpatient    10/11/1945 MRN [id number]   [facility] 7NE-A Attending Lisa Castillo MD   [facility] Day # 6 PCP Mariella Piedra MD     ADMISSION DATE AND TIME: 10/21/2021 11:1 tamsulosin  0.4 mg Oral After dinner   • LORazepam  2 mg Oral Nightly         ASSESSMENT AND PLAN:     Gaviota Del Cid is a 64year old male with mesothelioma and now deep vein thrombosis and PE     Mesothelioma   Recurrent disease and had progressed on c

## 2021-10-28 NOTE — HOME CARE LIAISON
Received referral via [employer] for Home Health services. Spoke w/ patient and wife Sofia Cerna and provided with list of [facility] providers from [address], patient choice is [address] [address].  Agency reserved in [address] and contact information placed on AVS[employer]Financial in

## 2021-10-29 NOTE — CM/SW NOTE
10/29/21 1100   Discharge disposition   Expected discharge disposition Home-Health   Post Acute Care Provider [facility]   Discharge transportation Private car

## 2021-10-29 NOTE — OPERATIVE REPORT
[facility]    PATIENT'S NAME: Nevaeh Chamber   ATTENDING PHYSICIAN: Natacha Maxwell MD   OPERATING PHYSICIAN: Deidre Brewer M.D.    PATIENT ACCOUNT#:   [de-identified]    LOCATION:  [address] [place] [address]  MEDICAL RECORD #:   [id number]       DATE OF BIRTH: see the dissection pass toward the vein and easily palpate the subclavian artery.   Having removed the port, there was no active bleeding, and I identified what was left of the tract and oversewed it and oversewed the subcutaneous tissues that were somewhat
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
## 2021-11-02 PROBLEM — R50.9 FEVER: Status: RESOLVED | Noted: 2021-10-11 | Resolved: 2021-11-02

## 2021-11-02 PROBLEM — J94.8 HYDROPNEUMOTHORAX: Status: RESOLVED | Noted: 2021-10-11 | Resolved: 2021-11-02

## 2021-11-02 PROBLEM — R06.00 DYSPNEA: Status: RESOLVED | Noted: 2021-10-22 | Resolved: 2021-11-02

## 2021-11-02 PROBLEM — R91.8 LUNG MASS: Status: RESOLVED | Noted: 2021-10-11 | Resolved: 2021-11-02

## 2021-11-02 PROBLEM — J18.9 NOSOCOMIAL PNEUMONIA: Status: RESOLVED | Noted: 2021-10-11 | Resolved: 2021-11-02

## 2021-11-02 PROBLEM — R09.02 HYPOXIA: Status: RESOLVED | Noted: 2021-10-11 | Resolved: 2021-11-02

## 2021-11-02 PROBLEM — R06.00 DYSPNEA, UNSPECIFIED TYPE: Status: RESOLVED | Noted: 2021-10-22 | Resolved: 2021-11-02

## 2021-11-02 PROBLEM — R79.89 AZOTEMIA: Status: RESOLVED | Noted: 2021-10-11 | Resolved: 2021-11-02

## 2021-11-02 PROBLEM — R73.9 HYPERGLYCEMIA: Status: RESOLVED | Noted: 2021-10-11 | Resolved: 2021-11-02

## 2021-11-30 NOTE — DISCHARGE SUMMARY
General Medicine Discharge Summary     Patient ID:  Jinny Hartley  64year old  10/11/1945    Admit date: 10/21/2021    Discharge date and time: 10/28/2021  5:28 PM     Attending Physician: No att. providers found     Primary Care Physician: Griffin Hermosillo to dc on eliquis     # SOB  - Not hypoxic  - PE's are small but he has extensive disease in his chest including large loculated hydropneumothorax   - No cough, sputum  - Hold on Abx at this time   - albuterol PRN      # Hydropneumothorax  - Present on prio Historical    Melatonin 3 MG Oral Cap  Take 3 mg by mouth at bedtime. , Historical    atorvastatin 40 MG Oral Tab  Take 1 tablet (40 mg total) by mouth daily. , Normal, Disp-90 tablet, R-3    LORazepam 1 MG Oral Tab  Take 2 mg by mouth nightly as needed (ins

## (undated) DEVICE — INTENDED TO BE USED TO OCCLUDE, RETRACT AND IDENTIFY ARTERIES, VEINS, TENDONS AND NERVES IN SURGICAL PROCEDURES: Brand: STERION®  VESSEL LOOP

## (undated) DEVICE — GEL AQUASONIC 100 20GR

## (undated) DEVICE — HEMOCLIP MED 24 CLIP/CARTRIDGE

## (undated) DEVICE — GAUZE SPONGES,12 PLY: Brand: CURITY

## (undated) DEVICE — GOWN SURG AERO CHROME XXL

## (undated) DEVICE — SUTURE PROLENE 7-0 CC

## (undated) DEVICE — CV PACK-LF: Brand: MEDLINE INDUSTRIES, INC.

## (undated) DEVICE — 3M™ IOBAN™ 2 ANTIMICROBIAL INCISE DRAPE 6651EZ: Brand: IOBAN™ 2

## (undated) DEVICE — TRANSPOSAL ULTRAFLEX DUO/QUAD ULTRA CART MANIFOLD

## (undated) DEVICE — 1 ML TUBERCULIN SYRINGE REGULAR TIP: Brand: MONOJECT

## (undated) DEVICE — Device

## (undated) DEVICE — SUTURE POLYDEK 4-0 TIES 6-932

## (undated) DEVICE — SOL  .9 500ML

## (undated) DEVICE — TOWEL SURG OR 17X30IN BLUE

## (undated) DEVICE — SOL  .9 1000ML BTL

## (undated) DEVICE — SUTURE ETHIBOND EXCEL 3-0 SH

## (undated) DEVICE — SUTURE PROLENE 5-0 C-1

## (undated) DEVICE — GLOVE SURG TRIUMPH SZ 8

## (undated) DEVICE — FLOSEAL HEMOSTATIC MATRIX, 5ML: Brand: FLOSEAL HEMOSTATIC MATRIX

## (undated) DEVICE — SUTURE PROLENE 6-0 C-1

## (undated) DEVICE — UNDYED BRAIDED (POLYGLACTIN 910), SYNTHETIC ABSORBABLE SUTURE: Brand: COATED VICRYL

## (undated) DEVICE — STERILE POLYISOPRENE POWDER-FREE SURGICAL GLOVES: Brand: PROTEXIS

## (undated) DEVICE — SUTURE VICRYL 3-0 PS-1

## (undated) DEVICE — SUTURE VICRYL 2-0 CT-1

## (undated) NOTE — LETTER
[facility] [address], [address]  Consent for Procedure/Sedation    Date: 10/23/21    Time: 0800      1.  I authorize the performance upon Alexandra Dye the following: Peripheral angiography, atherectomy, percutaneous translumi Relationship to  to consent for patient: ___________________________   patient: ___________________    Witness: ______________________________________  Date: _____________________    Patient Name: [place] HALLMAN: 10/11/1945                 P

## (undated) NOTE — LETTER
Vicky Kent [address]  [address] [facility] [address], [address] [facility]  Authorization for Surgical Operation and Procedure     Date:___________                                                                                                         Time:__________ reactions, transmission of diseases such as Hepatitis, AIDS and Cytomegalovirus (CMV) and fluid overload. In the event that I wish to have an autologous transfusion of my own blood, or a directed donor transfusion. I will discuss this with my physician. reinstating the DNAR order. 10. Patients having a sterilization procedure: I understand that if the procedure is successful the results will be permanent and it will therefore be impossible for me to inseminate, conceive, or bear children.   I also underst necessary. Some examples are: Starting or using an “IV” to give me medicine, fluids or blood during my procedure, and having a breathing tube placed to help me breathe when I’m asleep (intubation).  In the event that my heart stops working properly, I under headache, bleeding, infection, seizure, irregular heart rhythms, and nerve injury.     I can change my mind about having anesthesia services at any time before I get the medicine.    __________________________________________________________________________

## (undated) NOTE — LETTER
[facility] [address], [address] [place] [address]  Consent for Procedure/Sedation    Date: ***    Time: ***      {edw ivs consent:9178}

## (undated) NOTE — LETTER
[facility]  Tae Berger 59 [address]  Consent for Procedure/Sedation    Date: *10/24/2021**    Time: *08:50**      1. I authorize the performance upon Lesly Feliz the following: Peripheral angiography, atherectomy, percutaneous Relationship to  to consent for patient: ___________________________   patient: ___________________    Witness: ______________________________________  Date: _____________________    Patient Name: Stephanie HALLMAN: 10/11/1945